# Patient Record
Sex: FEMALE | HISPANIC OR LATINO | Employment: FULL TIME | URBAN - METROPOLITAN AREA
[De-identification: names, ages, dates, MRNs, and addresses within clinical notes are randomized per-mention and may not be internally consistent; named-entity substitution may affect disease eponyms.]

---

## 2022-04-17 ENCOUNTER — HOSPITAL ENCOUNTER (EMERGENCY)
Facility: CLINIC | Age: 32
Discharge: HOME | End: 2022-04-17
Attending: EMERGENCY MEDICINE | Admitting: EMERGENCY MEDICINE
Payer: COMMERCIAL

## 2022-04-17 VITALS
DIASTOLIC BLOOD PRESSURE: 95 MMHG | TEMPERATURE: 98.6 F | OXYGEN SATURATION: 100 % | RESPIRATION RATE: 18 BRPM | HEART RATE: 73 BPM | WEIGHT: 196.21 LBS | SYSTOLIC BLOOD PRESSURE: 135 MMHG

## 2022-04-17 DIAGNOSIS — R10.12 LUQ ABDOMINAL PAIN: ICD-10-CM

## 2022-04-17 DIAGNOSIS — K29.50 CHRONIC GASTRITIS WITHOUT BLEEDING, UNSPECIFIED GASTRITIS TYPE: ICD-10-CM

## 2022-04-17 LAB
ALBUMIN SERPL-MCNC: 3.9 G/DL (ref 3.4–5)
ALBUMIN UR-MCNC: NEGATIVE MG/DL
ALP SERPL-CCNC: 53 U/L (ref 40–150)
ALT SERPL W P-5'-P-CCNC: 25 U/L (ref 0–50)
ANION GAP SERPL CALCULATED.3IONS-SCNC: 2 MMOL/L (ref 3–14)
APPEARANCE UR: CLEAR
AST SERPL W P-5'-P-CCNC: 17 U/L (ref 0–45)
BASOPHILS # BLD AUTO: 0 10E3/UL (ref 0–0.2)
BASOPHILS NFR BLD AUTO: 1 %
BILIRUB SERPL-MCNC: 0.3 MG/DL (ref 0.2–1.3)
BILIRUB UR QL STRIP: NEGATIVE
BUN SERPL-MCNC: 16 MG/DL (ref 7–30)
CALCIUM SERPL-MCNC: 8.9 MG/DL (ref 8.5–10.1)
CHLORIDE BLD-SCNC: 109 MMOL/L (ref 94–109)
CO2 SERPL-SCNC: 28 MMOL/L (ref 20–32)
COLOR UR AUTO: ABNORMAL
CREAT SERPL-MCNC: 0.55 MG/DL (ref 0.52–1.04)
EOSINOPHIL # BLD AUTO: 0.2 10E3/UL (ref 0–0.7)
EOSINOPHIL NFR BLD AUTO: 4 %
ERYTHROCYTE [DISTWIDTH] IN BLOOD BY AUTOMATED COUNT: 13.1 % (ref 10–15)
GFR SERPL CREATININE-BSD FRML MDRD: >90 ML/MIN/1.73M2
GLUCOSE BLD-MCNC: 91 MG/DL (ref 70–99)
GLUCOSE UR STRIP-MCNC: NEGATIVE MG/DL
HCG UR QL: NEGATIVE
HCT VFR BLD AUTO: 37.1 % (ref 35–47)
HGB BLD-MCNC: 12.1 G/DL (ref 11.7–15.7)
HGB UR QL STRIP: ABNORMAL
HOLD SPECIMEN: NORMAL
IMM GRANULOCYTES # BLD: 0 10E3/UL
IMM GRANULOCYTES NFR BLD: 0 %
KETONES UR STRIP-MCNC: NEGATIVE MG/DL
LEUKOCYTE ESTERASE UR QL STRIP: NEGATIVE
LIPASE SERPL-CCNC: 100 U/L (ref 73–393)
LYMPHOCYTES # BLD AUTO: 1.2 10E3/UL (ref 0.8–5.3)
LYMPHOCYTES NFR BLD AUTO: 33 %
MCH RBC QN AUTO: 28.6 PG (ref 26.5–33)
MCHC RBC AUTO-ENTMCNC: 32.6 G/DL (ref 31.5–36.5)
MCV RBC AUTO: 88 FL (ref 78–100)
MONOCYTES # BLD AUTO: 0.3 10E3/UL (ref 0–1.3)
MONOCYTES NFR BLD AUTO: 8 %
NEUTROPHILS # BLD AUTO: 1.8 10E3/UL (ref 1.6–8.3)
NEUTROPHILS NFR BLD AUTO: 54 %
NITRATE UR QL: NEGATIVE
NRBC # BLD AUTO: 0 10E3/UL
NRBC BLD AUTO-RTO: 0 /100
PH UR STRIP: 7 [PH] (ref 5–7)
PLATELET # BLD AUTO: 252 10E3/UL (ref 150–450)
POTASSIUM BLD-SCNC: 3.6 MMOL/L (ref 3.4–5.3)
PROT SERPL-MCNC: 7.1 G/DL (ref 6.8–8.8)
RBC # BLD AUTO: 4.23 10E6/UL (ref 3.8–5.2)
RBC URINE: 15 /HPF
SODIUM SERPL-SCNC: 139 MMOL/L (ref 133–144)
SP GR UR STRIP: 1.01 (ref 1–1.03)
SQUAMOUS EPITHELIAL: 8 /HPF
UROBILINOGEN UR STRIP-MCNC: NORMAL MG/DL
WBC # BLD AUTO: 3.5 10E3/UL (ref 4–11)
WBC URINE: 2 /HPF

## 2022-04-17 PROCEDURE — 83690 ASSAY OF LIPASE: CPT | Performed by: EMERGENCY MEDICINE

## 2022-04-17 PROCEDURE — 80053 COMPREHEN METABOLIC PANEL: CPT | Performed by: EMERGENCY MEDICINE

## 2022-04-17 PROCEDURE — 36415 COLL VENOUS BLD VENIPUNCTURE: CPT | Performed by: EMERGENCY MEDICINE

## 2022-04-17 PROCEDURE — 85025 COMPLETE CBC W/AUTO DIFF WBC: CPT | Performed by: EMERGENCY MEDICINE

## 2022-04-17 PROCEDURE — 250N000013 HC RX MED GY IP 250 OP 250 PS 637: Performed by: EMERGENCY MEDICINE

## 2022-04-17 PROCEDURE — 99283 EMERGENCY DEPT VISIT LOW MDM: CPT

## 2022-04-17 PROCEDURE — 81001 URINALYSIS AUTO W/SCOPE: CPT | Performed by: EMERGENCY MEDICINE

## 2022-04-17 PROCEDURE — 250N000009 HC RX 250: Performed by: EMERGENCY MEDICINE

## 2022-04-17 PROCEDURE — 81025 URINE PREGNANCY TEST: CPT | Performed by: EMERGENCY MEDICINE

## 2022-04-17 RX ORDER — SUCRALFATE 1 G/1
1 TABLET ORAL 4 TIMES DAILY
Qty: 40 TABLET | Refills: 0 | Status: SHIPPED | OUTPATIENT
Start: 2022-04-17 | End: 2022-04-17

## 2022-04-17 RX ORDER — SUCRALFATE 1 G/1
1 TABLET ORAL 4 TIMES DAILY
Qty: 40 TABLET | Refills: 0 | Status: SHIPPED | OUTPATIENT
Start: 2022-04-17 | End: 2022-04-27

## 2022-04-17 RX ADMIN — ALUMINUM HYDROXIDE, MAGNESIUM HYDROXIDE, AND SIMETHICONE 30 ML: 200; 200; 20 SUSPENSION ORAL at 14:15

## 2022-04-17 ASSESSMENT — ENCOUNTER SYMPTOMS
VOMITING: 0
FEVER: 0
NAUSEA: 0
CONSTIPATION: 1
DIARRHEA: 0
ABDOMINAL PAIN: 1

## 2022-04-17 NOTE — ED PROVIDER NOTES
History   Chief Complaint:  Abdominal Pain       The history is provided by the patient. A  was used (Croatian).      Lorin Cano is a 32 year old female with history of gastritis who presents with abdominal pain. Four days ago, the patient developed LUQ abdominal pain that initially lasted throughout the night. The pain resolved but then returned last night and lasted throughout the night. Today, her pain worsened and she cannot walk or move around without significant pain. She notes that her pain is resolved when she lays down. Additionally, she has been constipated but denies diarrhea, nausea, and vomiting. At home, she tried taking omeprazole but has not had any relief. She felt she needed to be seen in the ED today as she was no longer able to tolerate the pain. She also denies any recent fever or urinary symptoms.       Review of Systems   Constitutional: Negative for fever.   Gastrointestinal: Positive for abdominal pain and constipation. Negative for diarrhea, nausea and vomiting.   Genitourinary: Negative.    All other systems reviewed and are negative.      Allergies:  The patient has no known allergies.     Medications:  Omeprazole    Past Medical History:     Gastritis    Past Surgical History:    The patient has no pertinent surgical history.     Family History:    The patient has no known family history.    Social History:  The patient presents to the ED with a friend.      Physical Exam     Patient Vitals for the past 24 hrs:   BP Temp Temp src Pulse Resp SpO2 Weight   04/17/22 1304 (!) 135/95 98.6  F (37  C) Oral 73 18 100 % 89 kg (196 lb 3.4 oz)       Physical Exam  Vitals and nursing note reviewed.   Constitutional:       Appearance: Normal appearance.   HENT:      Head: Atraumatic.      Right Ear: External ear normal.      Left Ear: External ear normal.      Nose: Nose normal.      Mouth/Throat:      Mouth: Mucous membranes are moist.   Eyes:      Extraocular  Movements: Extraocular movements intact.      Conjunctiva/sclera: Conjunctivae normal.   Cardiovascular:      Rate and Rhythm: Normal rate and regular rhythm.      Heart sounds: No murmur heard.  Pulmonary:      Effort: Pulmonary effort is normal. No respiratory distress.      Breath sounds: Normal breath sounds. No wheezing, rhonchi or rales.   Abdominal:      General: Abdomen is flat. Bowel sounds are normal. There is no distension.      Palpations: Abdomen is soft.      Tenderness: There is abdominal tenderness (mild) in the epigastric area and left upper quadrant. There is no right CVA tenderness, left CVA tenderness, guarding or rebound.   Musculoskeletal:         General: No deformity or signs of injury.      Cervical back: Normal range of motion and neck supple.   Skin:     General: Skin is warm and dry.      Findings: No rash.   Neurological:      Mental Status: She is alert and oriented to person, place, and time.   Psychiatric:         Mood and Affect: Mood normal.         Behavior: Behavior normal.           Emergency Department Course     Laboratory:  Labs Ordered and Resulted from Time of ED Arrival to Time of ED Departure   COMPREHENSIVE METABOLIC PANEL - Abnormal       Result Value    Sodium 139      Potassium 3.6      Chloride 109      Carbon Dioxide (CO2) 28      Anion Gap 2 (*)     Urea Nitrogen 16      Creatinine 0.55      Calcium 8.9      Glucose 91      Alkaline Phosphatase 53      AST 17      ALT 25      Protein Total 7.1      Albumin 3.9      Bilirubin Total 0.3      GFR Estimate >90     ROUTINE UA WITH MICROSCOPIC REFLEX TO CULTURE - Abnormal    Color Urine Straw      Appearance Urine Clear      Glucose Urine Negative      Bilirubin Urine Negative      Ketones Urine Negative      Specific Gravity Urine 1.011      Blood Urine Moderate (*)     pH Urine 7.0      Protein Albumin Urine Negative      Urobilinogen Urine Normal      Nitrite Urine Negative      Leukocyte Esterase Urine Negative       RBC Urine 15 (*)     WBC Urine 2      Squamous Epithelials Urine 8 (*)    CBC WITH PLATELETS AND DIFFERENTIAL - Abnormal    WBC Count 3.5 (*)     RBC Count 4.23      Hemoglobin 12.1      Hematocrit 37.1      MCV 88      MCH 28.6      MCHC 32.6      RDW 13.1      Platelet Count 252      % Neutrophils 54      % Lymphocytes 33      % Monocytes 8      % Eosinophils 4      % Basophils 1      % Immature Granulocytes 0      NRBCs per 100 WBC 0      Absolute Neutrophils 1.8      Absolute Lymphocytes 1.2      Absolute Monocytes 0.3      Absolute Eosinophils 0.2      Absolute Basophils 0.0      Absolute Immature Granulocytes 0.0      Absolute NRBCs 0.0     LIPASE - Normal    Lipase 100     HCG QUALITATIVE URINE - Normal    hCG Urine Qualitative Negative            Emergency Department Course:             Reviewed:  I reviewed nursing notes and vitals    Assessments:  1345 I obtained history and examined the patient as noted above.   1623 I rechecked the patient and explained findings.     Interventions:  1415 Lidocaine 2% 15 mL & Maalox 15 mL PO    Disposition:  The patient was discharged to home.     Impression & Plan     CMS Diagnoses: None    Medical Decision Makin yo F with acute on chronic LUQ/epgiastric pain without other symptoms.  Suspect gastritis or PUD, or could be constipation or IBS, less likely pancreatitis.  No RUQ tenderness to suggest biliary etiology.  No lower abd pain/tenderness to suggest appy or GYN etiology.  Will check labs and try GI cocktail.     1623  Pt reports sig improvement after GI cocktail.  Labs are unremarkable.  Has some hematuria but she is on her period.  Will try adding carafate and rec she try TUMS, maalox etc at home PRN and she should f/u with PCP and possibly GI as well.      Diagnosis:    ICD-10-CM    1. LUQ abdominal pain  R10.12    2. Chronic gastritis without bleeding, unspecified gastritis type  K29.50        Discharge Medications:  Discharge Medication List as of  4/17/2022  4:47 PM      START taking these medications    Details   sucralfate (CARAFATE) 1 GM tablet Take 1 tablet (1 g) by mouth 4 times daily for 10 days, Disp-40 tablet, R-0, E-Prescribe             Scribe Disclosure:  I, Yeimi Case, am serving as a scribe at 1:25 PM on 4/17/2022 to document services personally performed by Hernandez Rees MD based on my observations and the provider's statements to me.              Hernandez Rees MD  04/17/22 1914

## 2022-04-17 NOTE — ED TRIAGE NOTES
Pt presents for complaint of upper quadrant abdominal pain since last night. Pt states the pain has been worsening over that period of time. Pt also notes similar pain on Wednesday that lasted into Thursday. Pt states hx of gastritis diagnosed in [place] x3 years ago. Taking omeprazole since then. Denies nausea or vomiting. ABC intact, A&Ox4.

## 2022-12-28 ENCOUNTER — HOSPITAL ENCOUNTER (EMERGENCY)
Facility: CLINIC | Age: 32
Discharge: HOME | End: 2022-12-28
Attending: PHYSICIAN ASSISTANT | Admitting: PHYSICIAN ASSISTANT
Payer: COMMERCIAL

## 2022-12-28 ENCOUNTER — APPOINTMENT (OUTPATIENT)
Dept: GENERAL RADIOLOGY | Facility: CLINIC | Age: 32
End: 2022-12-28
Attending: EMERGENCY MEDICINE
Payer: COMMERCIAL

## 2022-12-28 VITALS
SYSTOLIC BLOOD PRESSURE: 132 MMHG | DIASTOLIC BLOOD PRESSURE: 99 MMHG | OXYGEN SATURATION: 100 % | TEMPERATURE: 98.2 F | RESPIRATION RATE: 17 BRPM | HEART RATE: 86 BPM

## 2022-12-28 DIAGNOSIS — M54.50 ACUTE BILATERAL LOW BACK PAIN WITHOUT SCIATICA: ICD-10-CM

## 2022-12-28 DIAGNOSIS — S16.1XXA STRAIN OF NECK MUSCLE, INITIAL ENCOUNTER: ICD-10-CM
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
## 2022-12-28 PROCEDURE — 71046 X-RAY EXAM CHEST 2 VIEWS: CPT

## 2022-12-28 PROCEDURE — 250N000013 HC RX MED GY IP 250 OP 250 PS 637: Performed by: PHYSICIAN ASSISTANT

## 2022-12-28 PROCEDURE — 99284 EMERGENCY DEPT VISIT MOD MDM: CPT | Mod: 25

## 2022-12-28 PROCEDURE — 72040 X-RAY EXAM NECK SPINE 2-3 VW: CPT

## 2022-12-28 RX ORDER — ACETAMINOPHEN 500 MG
1000 TABLET ORAL EVERY 4 HOURS PRN
Status: DISCONTINUED | OUTPATIENT
Start: 2022-12-28 | End: 2022-12-28 | Stop reason: HOSPADM

## 2022-12-28 RX ADMIN — ACETAMINOPHEN 1000 MG: 500 TABLET ORAL at 20:21

## 2022-12-28 ASSESSMENT — ENCOUNTER SYMPTOMS
BACK PAIN: 1
NECK PAIN: 1

## 2022-12-28 NOTE — LETTER
December 28, 2022      To Whom It May Concern:      Lorin CAMILLA Jose Manuel Cano was seen in our Emergency Department today, 12/28/22.  I expect her condition to improve over the next 2 days.  She may return to work/school when improved.    Sincerely,        ALMITA PANIAGUA PA-C
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
## 2024-02-13 LAB
HEPATITIS B SURFACE ANTIGEN (EXTERNAL): NEGATIVE
HIV1+2 AB SERPL QL IA: NEGATIVE
RUBELLA ANTIBODY IGG (EXTERNAL): NORMAL

## 2024-06-06 LAB — TREPONEMA PALLIDUM ANTIBODY (EXTERNAL): NONREACTIVE

## 2024-08-22 ENCOUNTER — HOSPITAL ENCOUNTER (INPATIENT)
Facility: CLINIC | Age: 34
LOS: 3 days | Discharge: HOME HEALTH | End: 2024-08-25
Attending: STUDENT IN AN ORGANIZED HEALTH CARE EDUCATION/TRAINING PROGRAM | Admitting: STUDENT IN AN ORGANIZED HEALTH CARE EDUCATION/TRAINING PROGRAM
Payer: COMMERCIAL

## 2024-08-22 DIAGNOSIS — O13.9 GESTATIONAL HYPERTENSION, ANTEPARTUM: ICD-10-CM

## 2024-08-22 LAB
ABO/RH(D): NORMAL
ALBUMIN MFR UR ELPH: 11.7 MG/DL
ALBUMIN SERPL BCG-MCNC: 3.6 G/DL (ref 3.5–5.2)
ALP SERPL-CCNC: 174 U/L (ref 40–150)
ALT SERPL W P-5'-P-CCNC: 22 U/L (ref 0–50)
ANION GAP SERPL CALCULATED.3IONS-SCNC: 12 MMOL/L (ref 7–15)
ANTIBODY SCREEN: NEGATIVE
AST SERPL W P-5'-P-CCNC: 26 U/L (ref 0–45)
BILIRUB SERPL-MCNC: 0.2 MG/DL
BUN SERPL-MCNC: 11.6 MG/DL (ref 6–20)
CALCIUM SERPL-MCNC: 9.3 MG/DL (ref 8.8–10.4)
CHLORIDE SERPL-SCNC: 103 MMOL/L (ref 98–107)
CREAT SERPL-MCNC: 0.7 MG/DL (ref 0.51–0.95)
CREAT UR-MCNC: 46.2 MG/DL
EGFRCR SERPLBLD CKD-EPI 2021: >90 ML/MIN/1.73M2
ERYTHROCYTE [DISTWIDTH] IN BLOOD BY AUTOMATED COUNT: 14 % (ref 10–15)
GLUCOSE BLDC GLUCOMTR-MCNC: 82 MG/DL (ref 70–99)
GLUCOSE SERPL-MCNC: 96 MG/DL (ref 70–99)
HCO3 SERPL-SCNC: 22 MMOL/L (ref 22–29)
HCT VFR BLD AUTO: 37.4 % (ref 35–47)
HGB BLD-MCNC: 12.7 G/DL (ref 11.7–15.7)
MCH RBC QN AUTO: 28.5 PG (ref 26.5–33)
MCHC RBC AUTO-ENTMCNC: 34 G/DL (ref 31.5–36.5)
MCV RBC AUTO: 84 FL (ref 78–100)
PLATELET # BLD AUTO: 206 10E3/UL (ref 150–450)
POTASSIUM SERPL-SCNC: 4 MMOL/L (ref 3.4–5.3)
PROT SERPL-MCNC: 6.1 G/DL (ref 6.4–8.3)
PROT/CREAT 24H UR: 0.25 MG/MG CR (ref 0–0.2)
RBC # BLD AUTO: 4.45 10E6/UL (ref 3.8–5.2)
SODIUM SERPL-SCNC: 137 MMOL/L (ref 135–145)
SPECIMEN EXPIRATION DATE: NORMAL
WBC # BLD AUTO: 4.5 10E3/UL (ref 4–11)

## 2024-08-22 PROCEDURE — 36415 COLL VENOUS BLD VENIPUNCTURE: CPT | Performed by: STUDENT IN AN ORGANIZED HEALTH CARE EDUCATION/TRAINING PROGRAM

## 2024-08-22 PROCEDURE — 120N000001 HC R&B MED SURG/OB

## 2024-08-22 PROCEDURE — 250N000013 HC RX MED GY IP 250 OP 250 PS 637: Performed by: STUDENT IN AN ORGANIZED HEALTH CARE EDUCATION/TRAINING PROGRAM

## 2024-08-22 PROCEDURE — 80053 COMPREHEN METABOLIC PANEL: CPT | Performed by: STUDENT IN AN ORGANIZED HEALTH CARE EDUCATION/TRAINING PROGRAM

## 2024-08-22 PROCEDURE — 86780 TREPONEMA PALLIDUM: CPT | Performed by: STUDENT IN AN ORGANIZED HEALTH CARE EDUCATION/TRAINING PROGRAM

## 2024-08-22 PROCEDURE — 86900 BLOOD TYPING SEROLOGIC ABO: CPT | Performed by: STUDENT IN AN ORGANIZED HEALTH CARE EDUCATION/TRAINING PROGRAM

## 2024-08-22 PROCEDURE — 84156 ASSAY OF PROTEIN URINE: CPT | Performed by: STUDENT IN AN ORGANIZED HEALTH CARE EDUCATION/TRAINING PROGRAM

## 2024-08-22 PROCEDURE — 250N000012 HC RX MED GY IP 250 OP 636 PS 637: Performed by: STUDENT IN AN ORGANIZED HEALTH CARE EDUCATION/TRAINING PROGRAM

## 2024-08-22 PROCEDURE — 85027 COMPLETE CBC AUTOMATED: CPT | Performed by: STUDENT IN AN ORGANIZED HEALTH CARE EDUCATION/TRAINING PROGRAM

## 2024-08-22 RX ORDER — CITRIC ACID/SODIUM CITRATE 334-500MG
30 SOLUTION, ORAL ORAL
Status: DISCONTINUED | OUTPATIENT
Start: 2024-08-22 | End: 2024-08-25 | Stop reason: HOSPADM

## 2024-08-22 RX ORDER — NALOXONE HYDROCHLORIDE 0.4 MG/ML
0.2 INJECTION, SOLUTION INTRAMUSCULAR; INTRAVENOUS; SUBCUTANEOUS
Status: DISCONTINUED | OUTPATIENT
Start: 2024-08-22 | End: 2024-08-25 | Stop reason: HOSPADM

## 2024-08-22 RX ORDER — TRANEXAMIC ACID 10 MG/ML
1 INJECTION, SOLUTION INTRAVENOUS EVERY 30 MIN PRN
Status: DISCONTINUED | OUTPATIENT
Start: 2024-08-22 | End: 2024-08-23

## 2024-08-22 RX ORDER — OXYTOCIN 10 [USP'U]/ML
10 INJECTION, SOLUTION INTRAMUSCULAR; INTRAVENOUS
Status: DISCONTINUED | OUTPATIENT
Start: 2024-08-22 | End: 2024-08-25 | Stop reason: HOSPADM

## 2024-08-22 RX ORDER — KETOROLAC TROMETHAMINE 30 MG/ML
30 INJECTION, SOLUTION INTRAMUSCULAR; INTRAVENOUS
Status: COMPLETED | OUTPATIENT
Start: 2024-08-22 | End: 2024-08-23

## 2024-08-22 RX ORDER — PROCHLORPERAZINE 25 MG
25 SUPPOSITORY, RECTAL RECTAL EVERY 12 HOURS PRN
Status: DISCONTINUED | OUTPATIENT
Start: 2024-08-22 | End: 2024-08-25 | Stop reason: HOSPADM

## 2024-08-22 RX ORDER — HUMAN INSULIN 100 [IU]/ML
16 INJECTION, SUSPENSION SUBCUTANEOUS AT BEDTIME
Status: ON HOLD | COMMUNITY
Start: 2024-07-25 | End: 2024-08-25

## 2024-08-22 RX ORDER — TERBUTALINE SULFATE 1 MG/ML
0.25 INJECTION, SOLUTION SUBCUTANEOUS
Status: DISCONTINUED | OUTPATIENT
Start: 2024-08-22 | End: 2024-08-23 | Stop reason: HOSPADM

## 2024-08-22 RX ORDER — LIDOCAINE 40 MG/G
CREAM TOPICAL
Status: DISCONTINUED | OUTPATIENT
Start: 2024-08-22 | End: 2024-08-25 | Stop reason: HOSPADM

## 2024-08-22 RX ORDER — OXYTOCIN 10 [USP'U]/ML
10 INJECTION, SOLUTION INTRAMUSCULAR; INTRAVENOUS
Status: DISCONTINUED | OUTPATIENT
Start: 2024-08-22 | End: 2024-08-23

## 2024-08-22 RX ORDER — NALOXONE HYDROCHLORIDE 0.4 MG/ML
0.4 INJECTION, SOLUTION INTRAMUSCULAR; INTRAVENOUS; SUBCUTANEOUS
Status: DISCONTINUED | OUTPATIENT
Start: 2024-08-22 | End: 2024-08-25 | Stop reason: HOSPADM

## 2024-08-22 RX ORDER — PROCHLORPERAZINE MALEATE 10 MG
10 TABLET ORAL EVERY 6 HOURS PRN
Status: DISCONTINUED | OUTPATIENT
Start: 2024-08-22 | End: 2024-08-25 | Stop reason: HOSPADM

## 2024-08-22 RX ORDER — FAMOTIDINE 20 MG/1
20 TABLET, FILM COATED ORAL
Status: ON HOLD | COMMUNITY
Start: 2024-08-12 | End: 2024-08-25

## 2024-08-22 RX ORDER — ASPIRIN 81 MG/1
1 TABLET ORAL DAILY
Status: ON HOLD | COMMUNITY
Start: 2024-02-13 | End: 2024-08-25

## 2024-08-22 RX ORDER — MISOPROSTOL 100 UG/1
25 TABLET ORAL
Status: DISCONTINUED | OUTPATIENT
Start: 2024-08-22 | End: 2024-08-23 | Stop reason: HOSPADM

## 2024-08-22 RX ORDER — DEXTROSE MONOHYDRATE 25 G/50ML
25-50 INJECTION, SOLUTION INTRAVENOUS
Status: DISCONTINUED | OUTPATIENT
Start: 2024-08-22 | End: 2024-08-23

## 2024-08-22 RX ORDER — MISOPROSTOL 200 UG/1
400 TABLET ORAL
Status: DISCONTINUED | OUTPATIENT
Start: 2024-08-22 | End: 2024-08-23

## 2024-08-22 RX ORDER — PRENATAL VIT/IRON FUM/FOLIC AC 27MG-0.8MG
1 TABLET ORAL DAILY
COMMUNITY
Start: 2024-02-06

## 2024-08-22 RX ORDER — ACETAMINOPHEN 325 MG/1
650 TABLET ORAL EVERY 4 HOURS PRN
Status: DISCONTINUED | OUTPATIENT
Start: 2024-08-22 | End: 2024-08-23

## 2024-08-22 RX ORDER — OXYTOCIN/0.9 % SODIUM CHLORIDE 30/500 ML
340 PLASTIC BAG, INJECTION (ML) INTRAVENOUS CONTINUOUS PRN
Status: DISCONTINUED | OUTPATIENT
Start: 2024-08-22 | End: 2024-08-23

## 2024-08-22 RX ORDER — ONDANSETRON 2 MG/ML
4 INJECTION INTRAMUSCULAR; INTRAVENOUS EVERY 6 HOURS PRN
Status: DISCONTINUED | OUTPATIENT
Start: 2024-08-22 | End: 2024-08-25 | Stop reason: HOSPADM

## 2024-08-22 RX ORDER — SODIUM CHLORIDE, SODIUM LACTATE, POTASSIUM CHLORIDE, CALCIUM CHLORIDE 600; 310; 30; 20 MG/100ML; MG/100ML; MG/100ML; MG/100ML
INJECTION, SOLUTION INTRAVENOUS CONTINUOUS
Status: DISCONTINUED | OUTPATIENT
Start: 2024-08-22 | End: 2024-08-25 | Stop reason: HOSPADM

## 2024-08-22 RX ORDER — METHYLERGONOVINE MALEATE 0.2 MG/ML
200 INJECTION INTRAVENOUS
Status: DISCONTINUED | OUTPATIENT
Start: 2024-08-22 | End: 2024-08-23

## 2024-08-22 RX ORDER — LOPERAMIDE HCL 2 MG
4 CAPSULE ORAL
Status: DISCONTINUED | OUTPATIENT
Start: 2024-08-22 | End: 2024-08-23

## 2024-08-22 RX ORDER — CARBOPROST TROMETHAMINE 250 UG/ML
250 INJECTION, SOLUTION INTRAMUSCULAR
Status: DISCONTINUED | OUTPATIENT
Start: 2024-08-22 | End: 2024-08-23

## 2024-08-22 RX ORDER — OXYTOCIN/0.9 % SODIUM CHLORIDE 30/500 ML
100-340 PLASTIC BAG, INJECTION (ML) INTRAVENOUS CONTINUOUS PRN
Status: DISCONTINUED | OUTPATIENT
Start: 2024-08-22 | End: 2024-08-23

## 2024-08-22 RX ORDER — ONDANSETRON 4 MG/1
4 TABLET, ORALLY DISINTEGRATING ORAL EVERY 6 HOURS PRN
Status: DISCONTINUED | OUTPATIENT
Start: 2024-08-22 | End: 2024-08-25 | Stop reason: HOSPADM

## 2024-08-22 RX ORDER — IBUPROFEN 800 MG/1
800 TABLET, FILM COATED ORAL
Status: COMPLETED | OUTPATIENT
Start: 2024-08-22 | End: 2024-08-23

## 2024-08-22 RX ORDER — FENTANYL CITRATE 50 UG/ML
50 INJECTION, SOLUTION INTRAMUSCULAR; INTRAVENOUS EVERY 30 MIN PRN
Status: DISCONTINUED | OUTPATIENT
Start: 2024-08-22 | End: 2024-08-23

## 2024-08-22 RX ORDER — MISOPROSTOL 200 UG/1
800 TABLET ORAL
Status: DISCONTINUED | OUTPATIENT
Start: 2024-08-22 | End: 2024-08-23

## 2024-08-22 RX ORDER — LOPERAMIDE HCL 2 MG
2 CAPSULE ORAL
Status: DISCONTINUED | OUTPATIENT
Start: 2024-08-22 | End: 2024-08-23

## 2024-08-22 RX ORDER — NICOTINE POLACRILEX 4 MG
15-30 LOZENGE BUCCAL
Status: DISCONTINUED | OUTPATIENT
Start: 2024-08-22 | End: 2024-08-23

## 2024-08-22 RX ORDER — METOCLOPRAMIDE 10 MG/1
10 TABLET ORAL EVERY 6 HOURS PRN
Status: DISCONTINUED | OUTPATIENT
Start: 2024-08-22 | End: 2024-08-25 | Stop reason: HOSPADM

## 2024-08-22 RX ORDER — HYDROXYZINE HYDROCHLORIDE 50 MG/1
50 TABLET, FILM COATED ORAL
Status: DISCONTINUED | OUTPATIENT
Start: 2024-08-22 | End: 2024-08-23 | Stop reason: HOSPADM

## 2024-08-22 RX ORDER — METOCLOPRAMIDE HYDROCHLORIDE 5 MG/ML
10 INJECTION INTRAMUSCULAR; INTRAVENOUS EVERY 6 HOURS PRN
Status: DISCONTINUED | OUTPATIENT
Start: 2024-08-22 | End: 2024-08-25 | Stop reason: HOSPADM

## 2024-08-22 RX ADMIN — INSULIN HUMAN 8 UNITS: 100 INJECTION, SUSPENSION SUBCUTANEOUS at 22:28

## 2024-08-22 RX ADMIN — MISOPROSTOL 25 MCG: 100 TABLET ORAL at 21:19

## 2024-08-22 RX ADMIN — MISOPROSTOL 25 MCG: 100 TABLET ORAL at 23:21

## 2024-08-22 ASSESSMENT — ACTIVITIES OF DAILY LIVING (ADL)
ADLS_ACUITY_SCORE: 18

## 2024-08-23 LAB
ALBUMIN SERPL BCG-MCNC: 3.4 G/DL (ref 3.5–5.2)
ALP SERPL-CCNC: 154 U/L (ref 40–150)
ALT SERPL W P-5'-P-CCNC: 18 U/L (ref 0–50)
ANION GAP SERPL CALCULATED.3IONS-SCNC: 12 MMOL/L (ref 7–15)
AST SERPL W P-5'-P-CCNC: 23 U/L (ref 0–45)
BILIRUB SERPL-MCNC: 0.2 MG/DL
BUN SERPL-MCNC: 10.7 MG/DL (ref 6–20)
CALCIUM SERPL-MCNC: 8.7 MG/DL (ref 8.8–10.4)
CHLORIDE SERPL-SCNC: 104 MMOL/L (ref 98–107)
CREAT SERPL-MCNC: 0.57 MG/DL (ref 0.51–0.95)
EGFRCR SERPLBLD CKD-EPI 2021: >90 ML/MIN/1.73M2
ERYTHROCYTE [DISTWIDTH] IN BLOOD BY AUTOMATED COUNT: 13.9 % (ref 10–15)
GLUCOSE BLDC GLUCOMTR-MCNC: 127 MG/DL (ref 70–99)
GLUCOSE SERPL-MCNC: 104 MG/DL (ref 70–99)
HCO3 SERPL-SCNC: 19 MMOL/L (ref 22–29)
HCT VFR BLD AUTO: 38 % (ref 35–47)
HGB BLD-MCNC: 12.2 G/DL (ref 11.7–15.7)
MCH RBC QN AUTO: 27.8 PG (ref 26.5–33)
MCHC RBC AUTO-ENTMCNC: 32.1 G/DL (ref 31.5–36.5)
MCV RBC AUTO: 87 FL (ref 78–100)
PLATELET # BLD AUTO: 204 10E3/UL (ref 150–450)
POTASSIUM SERPL-SCNC: 4.4 MMOL/L (ref 3.4–5.3)
PROT SERPL-MCNC: 5.9 G/DL (ref 6.4–8.3)
RBC # BLD AUTO: 4.39 10E6/UL (ref 3.8–5.2)
SODIUM SERPL-SCNC: 135 MMOL/L (ref 135–145)
T PALLIDUM AB SER QL: NONREACTIVE
WBC # BLD AUTO: 5 10E3/UL (ref 4–11)

## 2024-08-23 PROCEDURE — 999N000080 HC STATISTIC IP LACTATION SERVICES 16-30 MIN

## 2024-08-23 PROCEDURE — 0KQM0ZZ REPAIR PERINEUM MUSCLE, OPEN APPROACH: ICD-10-PCS | Performed by: OBSTETRICS & GYNECOLOGY

## 2024-08-23 PROCEDURE — 250N000009 HC RX 250: Performed by: STUDENT IN AN ORGANIZED HEALTH CARE EDUCATION/TRAINING PROGRAM

## 2024-08-23 PROCEDURE — 120N000001 HC R&B MED SURG/OB

## 2024-08-23 PROCEDURE — 250N000013 HC RX MED GY IP 250 OP 250 PS 637: Performed by: OBSTETRICS & GYNECOLOGY

## 2024-08-23 PROCEDURE — 722N000001 HC LABOR CARE VAGINAL DELIVERY SINGLE

## 2024-08-23 PROCEDURE — 258N000003 HC RX IP 258 OP 636: Performed by: STUDENT IN AN ORGANIZED HEALTH CARE EDUCATION/TRAINING PROGRAM

## 2024-08-23 PROCEDURE — 80053 COMPREHEN METABOLIC PANEL: CPT | Performed by: STUDENT IN AN ORGANIZED HEALTH CARE EDUCATION/TRAINING PROGRAM

## 2024-08-23 PROCEDURE — 250N000011 HC RX IP 250 OP 636: Performed by: STUDENT IN AN ORGANIZED HEALTH CARE EDUCATION/TRAINING PROGRAM

## 2024-08-23 PROCEDURE — 59409 OBSTETRICAL CARE: CPT | Performed by: OBSTETRICS & GYNECOLOGY

## 2024-08-23 PROCEDURE — 250N000013 HC RX MED GY IP 250 OP 250 PS 637: Performed by: STUDENT IN AN ORGANIZED HEALTH CARE EDUCATION/TRAINING PROGRAM

## 2024-08-23 PROCEDURE — 85014 HEMATOCRIT: CPT | Performed by: STUDENT IN AN ORGANIZED HEALTH CARE EDUCATION/TRAINING PROGRAM

## 2024-08-23 PROCEDURE — 36415 COLL VENOUS BLD VENIPUNCTURE: CPT | Performed by: STUDENT IN AN ORGANIZED HEALTH CARE EDUCATION/TRAINING PROGRAM

## 2024-08-23 RX ORDER — DOCUSATE SODIUM 100 MG/1
100 CAPSULE, LIQUID FILLED ORAL DAILY
Status: DISCONTINUED | OUTPATIENT
Start: 2024-08-23 | End: 2024-08-25 | Stop reason: HOSPADM

## 2024-08-23 RX ORDER — LABETALOL HYDROCHLORIDE 5 MG/ML
20-80 INJECTION, SOLUTION INTRAVENOUS EVERY 10 MIN PRN
Status: DISCONTINUED | OUTPATIENT
Start: 2024-08-23 | End: 2024-08-25 | Stop reason: HOSPADM

## 2024-08-23 RX ORDER — IBUPROFEN 800 MG/1
800 TABLET, FILM COATED ORAL EVERY 6 HOURS PRN
Status: DISCONTINUED | OUTPATIENT
Start: 2024-08-23 | End: 2024-08-25 | Stop reason: HOSPADM

## 2024-08-23 RX ORDER — OXYTOCIN/0.9 % SODIUM CHLORIDE 30/500 ML
340 PLASTIC BAG, INJECTION (ML) INTRAVENOUS CONTINUOUS PRN
Status: DISCONTINUED | OUTPATIENT
Start: 2024-08-23 | End: 2024-08-25 | Stop reason: HOSPADM

## 2024-08-23 RX ORDER — HYDRALAZINE HYDROCHLORIDE 20 MG/ML
10 INJECTION INTRAMUSCULAR; INTRAVENOUS
Status: DISCONTINUED | OUTPATIENT
Start: 2024-08-23 | End: 2024-08-25 | Stop reason: HOSPADM

## 2024-08-23 RX ORDER — NICOTINE POLACRILEX 4 MG
15-30 LOZENGE BUCCAL
Status: DISCONTINUED | OUTPATIENT
Start: 2024-08-23 | End: 2024-08-25 | Stop reason: HOSPADM

## 2024-08-23 RX ORDER — CARBOPROST TROMETHAMINE 250 UG/ML
250 INJECTION, SOLUTION INTRAMUSCULAR
Status: DISCONTINUED | OUTPATIENT
Start: 2024-08-23 | End: 2024-08-25 | Stop reason: HOSPADM

## 2024-08-23 RX ORDER — NIFEDIPINE 30 MG/1
30 TABLET, EXTENDED RELEASE ORAL DAILY
Status: DISCONTINUED | OUTPATIENT
Start: 2024-08-23 | End: 2024-08-25 | Stop reason: HOSPADM

## 2024-08-23 RX ORDER — OXYTOCIN 10 [USP'U]/ML
10 INJECTION, SOLUTION INTRAMUSCULAR; INTRAVENOUS
Status: DISCONTINUED | OUTPATIENT
Start: 2024-08-23 | End: 2024-08-25 | Stop reason: HOSPADM

## 2024-08-23 RX ORDER — HYDROCORTISONE 25 MG/G
CREAM TOPICAL 3 TIMES DAILY PRN
Status: DISCONTINUED | OUTPATIENT
Start: 2024-08-23 | End: 2024-08-25 | Stop reason: HOSPADM

## 2024-08-23 RX ORDER — BISACODYL 10 MG
10 SUPPOSITORY, RECTAL RECTAL DAILY PRN
Status: DISCONTINUED | OUTPATIENT
Start: 2024-08-23 | End: 2024-08-25 | Stop reason: HOSPADM

## 2024-08-23 RX ORDER — OXYCODONE HYDROCHLORIDE 5 MG/1
5 TABLET ORAL EVERY 4 HOURS PRN
Status: DISCONTINUED | OUTPATIENT
Start: 2024-08-23 | End: 2024-08-25 | Stop reason: HOSPADM

## 2024-08-23 RX ORDER — LOPERAMIDE HCL 2 MG
2 CAPSULE ORAL
Status: DISCONTINUED | OUTPATIENT
Start: 2024-08-23 | End: 2024-08-25 | Stop reason: HOSPADM

## 2024-08-23 RX ORDER — DEXTROSE MONOHYDRATE 25 G/50ML
25-50 INJECTION, SOLUTION INTRAVENOUS
Status: DISCONTINUED | OUTPATIENT
Start: 2024-08-23 | End: 2024-08-25 | Stop reason: HOSPADM

## 2024-08-23 RX ORDER — MISOPROSTOL 200 UG/1
400 TABLET ORAL
Status: DISCONTINUED | OUTPATIENT
Start: 2024-08-23 | End: 2024-08-25 | Stop reason: HOSPADM

## 2024-08-23 RX ORDER — MODIFIED LANOLIN
OINTMENT (GRAM) TOPICAL
Status: DISCONTINUED | OUTPATIENT
Start: 2024-08-23 | End: 2024-08-25 | Stop reason: HOSPADM

## 2024-08-23 RX ORDER — TRANEXAMIC ACID 10 MG/ML
1 INJECTION, SOLUTION INTRAVENOUS EVERY 30 MIN PRN
Status: DISCONTINUED | OUTPATIENT
Start: 2024-08-23 | End: 2024-08-25 | Stop reason: HOSPADM

## 2024-08-23 RX ORDER — METHYLERGONOVINE MALEATE 0.2 MG/ML
200 INJECTION INTRAVENOUS
Status: DISCONTINUED | OUTPATIENT
Start: 2024-08-23 | End: 2024-08-25 | Stop reason: HOSPADM

## 2024-08-23 RX ORDER — LOPERAMIDE HCL 2 MG
4 CAPSULE ORAL
Status: DISCONTINUED | OUTPATIENT
Start: 2024-08-23 | End: 2024-08-25 | Stop reason: HOSPADM

## 2024-08-23 RX ORDER — MISOPROSTOL 200 UG/1
800 TABLET ORAL
Status: DISCONTINUED | OUTPATIENT
Start: 2024-08-23 | End: 2024-08-25 | Stop reason: HOSPADM

## 2024-08-23 RX ORDER — ACETAMINOPHEN 325 MG/1
650 TABLET ORAL EVERY 4 HOURS PRN
Status: DISCONTINUED | OUTPATIENT
Start: 2024-08-23 | End: 2024-08-25 | Stop reason: HOSPADM

## 2024-08-23 RX ADMIN — Medication 340 ML/HR: at 05:23

## 2024-08-23 RX ADMIN — ACETAMINOPHEN 650 MG: 325 TABLET, FILM COATED ORAL at 12:43

## 2024-08-23 RX ADMIN — LIDOCAINE HYDROCHLORIDE 20 ML: 10 INJECTION, SOLUTION EPIDURAL; INFILTRATION; INTRACAUDAL; PERINEURAL at 05:30

## 2024-08-23 RX ADMIN — SODIUM CHLORIDE, POTASSIUM CHLORIDE, SODIUM LACTATE AND CALCIUM CHLORIDE: 600; 310; 30; 20 INJECTION, SOLUTION INTRAVENOUS at 02:00

## 2024-08-23 RX ADMIN — MISOPROSTOL 25 MCG: 100 TABLET ORAL at 03:27

## 2024-08-23 RX ADMIN — MISOPROSTOL 25 MCG: 100 TABLET ORAL at 01:21

## 2024-08-23 RX ADMIN — MISOPROSTOL 800 MCG: 200 TABLET ORAL at 05:41

## 2024-08-23 RX ADMIN — KETOROLAC TROMETHAMINE 30 MG: 30 INJECTION, SOLUTION INTRAMUSCULAR at 06:46

## 2024-08-23 RX ADMIN — NIFEDIPINE 30 MG: 30 TABLET, EXTENDED RELEASE ORAL at 14:17

## 2024-08-23 RX ADMIN — IBUPROFEN 800 MG: 800 TABLET, FILM COATED ORAL at 14:11

## 2024-08-23 RX ADMIN — DOCUSATE SODIUM 100 MG: 100 CAPSULE, LIQUID FILLED ORAL at 10:05

## 2024-08-23 RX ADMIN — IBUPROFEN 800 MG: 800 TABLET, FILM COATED ORAL at 20:36

## 2024-08-23 RX ADMIN — ACETAMINOPHEN 650 MG: 325 TABLET, FILM COATED ORAL at 16:40

## 2024-08-23 ASSESSMENT — ACTIVITIES OF DAILY LIVING (ADL)
ADLS_ACUITY_SCORE: 22
ADLS_ACUITY_SCORE: 18
ADLS_ACUITY_SCORE: 18
ADLS_ACUITY_SCORE: 22
ADLS_ACUITY_SCORE: 22
ADLS_ACUITY_SCORE: 18
ADLS_ACUITY_SCORE: 18
ADLS_ACUITY_SCORE: 22
ADLS_ACUITY_SCORE: 18
ADLS_ACUITY_SCORE: 18
ADLS_ACUITY_SCORE: 22
ADLS_ACUITY_SCORE: 18
ADLS_ACUITY_SCORE: 22
ADLS_ACUITY_SCORE: 18
ADLS_ACUITY_SCORE: 22

## 2024-08-23 NOTE — PROVIDER NOTIFICATION
08/23/24 0148   Provider Notification   Provider Name/Title Dr. Chan   Method of Notification In Department     MD asked for glucose check on patient after baby was minimal for a while. RN took glucose and asked patient to switch sides. IV fluids will be started at a rate of 75mL/hr per MD request

## 2024-08-23 NOTE — PROVIDER NOTIFICATION
08/22/24 2041   Provider Notification   Provider Name/Title Dr. Alonso   Method of Notification At Bedside   Request Evaluate in Person     MD would like patient to receive 1/2 dose of her prescribed insulin dose. MD would like to start patient on miso PO and then recheck cervix later tonight. Patient is agreeable to plan.

## 2024-08-23 NOTE — DISCHARGE SUMMARY
[facility]   Discharge Summary    Lorin Cano YOB: 1990   MRN [id number] Primary OB: [facility]     Date of Admission: 2024   Date of Discharge: 2024   Admitting Physician: Lennie Chan MD   Discharge Physician:  Radha Freeman,        Admission Diagnoses   - Intrauterine pregnancy at 39w0d  - A2 gestational diabetes  - History of Preeclampsia  - Class 2 obesity     Discharge Diagnoses   - Same, now delivered  - Gestational hypertension  -GDM A2    Procedures   Vaginal delivery on 24 at 39w1d       Medications Prior to Admission     Medications Prior to Admission   Medication Sig Dispense Refill Last Dose    Prenatal Vit-Fe Fumarate-FA (PRENATAL MULTIVITAMIN W/IRON) 27-0.8 MG tablet Take 1 tablet by mouth daily.       [DISCONTINUED] aspirin 81 MG EC tablet Take 1 tablet by mouth daily.       [DISCONTINUED] famotidine (PEPCID) 20 MG tablet Take 20 mg by mouth.       [DISCONTINUED] NOVOLIN N VIAL 100 UNIT/ML susp Inject 16 Units subcutaneously at bedtime.         Discharge Medications     Current Discharge Medication List        START taking these medications    Details   acetaminophen (TYLENOL) 325 MG tablet Take 1-2 tablets (325-650 mg) by mouth every 6 hours as needed for mild pain or fever.  Qty: 60 tablet, Refills: 1    Associated Diagnoses:  (spontaneous vaginal delivery)      ibuprofen (ADVIL/MOTRIN) 800 MG tablet Take 1 tablet (800 mg) by mouth every 6 hours as needed for other or moderate pain (cramping).  Qty: 30 tablet, Refills: 1    Associated Diagnoses:  (spontaneous vaginal delivery)      NIFEdipine ER OSMOTIC (ADALAT CC) 30 MG 24 hr tablet Take 1 tablet (30 mg) by mouth daily.  Qty: 30 tablet, Refills: 0    Associated Diagnoses: Gestational hypertension, antepartum           CONTINUE these medications which have NOT CHANGED    Details   Prenatal Vit-Fe Fumarate-FA (PRENATAL MULTIVITAMIN W/IRON) 27-0.8 MG tablet Take 1  tablet by mouth daily.           STOP taking these medications       aspirin 81 MG EC tablet Comments:   Reason for Stopping:         famotidine (PEPCID) 20 MG tablet Comments:   Reason for Stopping:         NOVOLIN N VIAL 100 UNIT/ML susp Comments:   Reason for Stopping:              Brief Admission History     From the admit note of Dr. Chan:   Lorin Cano is a 34 year old  at 39w0d by 11wk US admitted for induction of labor.     #. Induction of Labor:   - IOL for A2 gestational diabetes  - Risks, benefits, alternatives of IOL again explained. All questions answered.   - Initial Reyes score 5, will proceed with PO Misoprostol for cervical ripening, reassess cervix after 4 doses or sooner if clinically indicated.   - Pitocin per protocol for augmentation once sufficiently ripened. AROM as indicated.   - General diet, up ad ophelia. Pain control per patient preference.   - PPH Risk: elevated (induction), no medications contraindicated     #. Fetal Well Being:   - Cephalic, GBS negative  - Continuous external fetal monitoring     #. Gestational diabetes, A2  - On , Vtx, Ant placenta, EFW 44%, AC 77%  -  testing: twice weekly has been reassuring  - Has been on Novolin 16 units at bedtime, will give 1/2 dose (8 units) this evening  - Blood sugar checks per protocol, initiate insulin gtt as indicated      #. Prenatal Care:   - OB labs reviewed: A positive, Rubella immune, Heb B Ag non-reactive, HIV negative, RPR negative  - Genetics: NIPS nl, AFP low risk  - Anatomy ultrasound: level 2 US normal, fundal placenta   - Rh positive, Rhogam not indicated  - GCT failed, failed 2 hr at 28 weeks along with hgb 11.5, RPR  - S/p flu 24, Tdap 24, s/p Covid vaccine   - GBS negative  - Feed: breast, pump script given   - Contraception: pills v Nexplanon  - Peds: Likely PN - [place].      #. Hx Preeclampsia:   - Delivery in [facility]; records not available  - Developed around 38w and had 1  week PP hospital stay  - HELLP labs normal at NOB1 and 28 weeks  - First BP elevated on admission, repeat HELLP labs ordered     #. Disposition: inpatient     Intrapartum Course     Initial SVE was unfavorable and Lorin received 4 doses of Misoprostol for cervical ripening. Shortly after administration of dose #4, she became very uncomfortable and  was 4/70/-2. She rapidly progressed to 9cm dilated and then delivered with the in-house doctor. There was a second degree laceration that was repaired. APGARs 8, 9. EBL 100mL, and rectal misoprostol was administered for a small amount of bleeding after delivery.      Postpartum Course     The patient's hospital course was unremarkable. She received routine postpartum care and recovered without complication. On PPD#2, her pain was well controlled with PO medications and lochia was stable. She was ambulating, voiding without difficulty, and tolerating a general diet. Breastfeeding well. Infant was stable.  She was discharged to home in stable condition.     Intrapartum, she was diagnosed with gestational hypertension based on mild range blood pressures. HELLP labs were collected and were stable. Postpartum, her blood pressures still elevated so procardia 30mg XL was started on PPD #0 and her BP remained normal after that. By PPD#2 her BP was normal and she was stable for discharge.      Postpartum Hemoglobin:   Hemoglobin   Date Value Ref Range Status   08/24/2024 11.1 (L) 11.7 - 15.7 g/dL Final     Rh Status: positive, Rhogam is not indicated.   Rubella Status: immune, MMR is not indicated.     Discharge Instructions & Follow Up     Discharge Diet Regular   Discharge Activity Pelvic rest for 6 weeks including no sexual intercourse, tampons, or douching.     Discharge Follow Up Follow up with primary OB for BP check in 1 week and routine postpartum visit in 6 weeks     Discharge Disposition   Home    Jennifer M. Schlies, DO [facility]  08/23/2024 6:40 AM

## 2024-08-23 NOTE — LACTATION NOTE
Lactation visit with patient with  on IPAD. Patient states she nursed her first for 3-4 weeks had to go back to work and lost supply. Goal is to breastfeed this baby for a longer period of time. Reviewed normal course of lactation, being deligent with feeds to keep infants sugars stable due to mother diabetes.   Assisted with getting infant to breast. Baby needing some stimulation to get latch. Compressions done and baby moved to a suck pattern with swallows heard. Encouraged to watch for feeding cues. Needing a pump for home.     Writer in to assist with 1230 feed. Already had baby latched. Assisted with second breast to get a deeper latch. Good swallows heard. Encouraged to call for assistance prn.

## 2024-08-23 NOTE — CARE PLAN
Data: Patient admitted to room 407 at . Patient is a . Prenatal record reviewed.   OB History    Para Term  AB Living   2 1 1 0 0 1   SAB IAB Ectopic Multiple Live Births   0 0 0 0 1      # Outcome Date GA Lbr Christopher/2nd Weight Sex Type Anes PTL Lv   2 Current            1 Term 05/18/10 38w4d  3.317 kg (7 lb 5 oz) M Vag-Spont   ELISEO      Birth Comments: breastfeeding for 1 month r/t lack of supply      Complications: Preeclampsia/Hypertension      Name: [place]   .  Medical History:   Past Medical History:   Diagnosis Date    Gestational Diabetes    .  Gestational age 39w0d. Vital signs per doc flowsheet. Fetal movement present. Patient reports Induction Of Labor   as reason for admission. Support persons is present.  Action: Verbal consent for EFM, external fetal monitors applied. Admission assessment completed. Patient and support persons educated on labor process. Patient instructed to report change in fetal movement, contractions, vaginal leaking of fluid or bleeding, abdominal pain, or any concerns related to the pregnancy to her nurse/physician. Patient oriented to room, call light in reach.   Response: Dr. Alonso informed of patient admission. Plan per provider is PO miso for a couple of doses then recheck and potentially start pitocin. Patient verbalized understanding of education and verbalized agreement with plan.

## 2024-08-23 NOTE — CARE PLAN
Patient informed RN that she is receiving care from [facility] for food insecurity. She is also requesting that she have more information on at home public health nurse visits for after the infant is born.

## 2024-08-23 NOTE — PLAN OF CARE
Goal Outcome Evaluation:      Plan of Care Reviewed With: patient    Overall Patient Progress: improvingOverall Patient Progress: improving  Mother and baby transferred to PP floor at 0835, mother able to ambulate to bed. Lungs clear, FF @ U, light rubra. Lactation here to assist with breastfeeding, mother placing baby to breast independently, will feed every 3 hours. Pt started on nifedipine today, last BP's were 145/78 and 131/76, HR 70's. Has minimal discomfort in perineal area.  Pt eating well. Mother has support at bedside, will continue plan of care.

## 2024-08-23 NOTE — L&D DELIVERY NOTE
OB Vaginal Delivery Note    Lorin Cano MRN# [id number]   Age: 34 year old YOB: 1990       Mom delivered precipitously.  Re-tearing her previous scar.  Mom having very slow bleed from above, will run the cervix and consider vag pack for one hour if slow bleed continues and no tear is obvious.      GA: 39w1d  GP:   Labor Complications:    EBL:   mL  Delivery QBL:    Delivery Type: Vaginal, Spontaneous   ROM to Delivery Time: (Delivered) Minutes: 1   Weight: 3.24 kg (7 lb 2.3 oz)    1 Minute 5 Minute 10 Minute   Apgar Totals:            MARICRUZ HOLGUIN;PRACHI GAVIRIA     Delivery Details:  Lorin Cano, a 34 year old  female delivered a viable infant with apgars of   and  . Patient was fully dilated and pushing after   hours   minutes in active labor. Delivery was via vaginal, spontaneous  to a sterile field under nitrous oxide  anesthesia. Infant delivered in vertex  left  occiput  anterior  position. Anterior and posterior shoulders delivered without difficulty. The cord was clamped, cut twice and 3 vessels  were noted. Cord blood was obtained in routine fashion with the following disposition: discard .      Cord complications: none   Placenta delivered at 2024  5:18 AM . Placental disposition was Hospital disposal . Fundal massage performed and fundus found to be firm.     Episiotomy: none    Perineum, vagina, cervix were inspected, and the following lacerations were noted:   Perineal lacerations: 2nd                Any lacerations were repaired in the usual fashion using 3-0 vicryl    Excellent hemostasis was noted. Needle count correct. Infant and patient in delivery room in good and stable condition.        Jose Manuel Cano, Female-Lorin [[id number]]      Labor Length      3rd Stage (hrs): 0 (min): 4          Labor Events     labor?: No   steroids: None  Labor Type: Induction/Cervical ripening  Predominate monitoring during 1st stage:  "continuous electronic fetal monitoring       Rupture date/time: 24 0512   Rupture type: Spontaneous Rupture of Membranes  Fluid color: Clear       Delivery/Placenta Date and Time      Delivery Date: 24 Delivery Time:  5:13 AM   Placenta Date/Time: 2024  5:18 AM  Oxytocin given at the time of delivery: after delivery of baby  Delivering clinician: Toni Echevarria MD   Other personnel present at delivery:  Provider Role   Alejandra Corey RN Fischer, Kendra N RN              Vaginal Counts       Initial count performed by 2 team members:  Two Team Members   Angie Echevarria         Needles Suture Needles Sponges (RETIRED) Instruments   Initial counts 2  5    Added to count  1     Relief counts       Final counts               Placed during labor Accounted for at the end of labor   FSE NA    IUPC NA    Cervidil NA                   Final count performed by 2 team members:  Two Team Members   Angie Echevarria      Final count correct?: Yes       Apgars    Living status: Living   1 Minute 5 Minute 10 Minute 15 Minute 20 Minute   Skin color:        Heart rate:        Reflex irritability:        Muscle tone:        Respiratory effort:        Total:               Cord      Vessels: 3 Vessels    Cord Complications: None               Cord Blood Disposition: Discard    Gases Sent?: No    Delayed cord clamping?: Yes    Cord Clamping Delay (seconds): 31-60 seconds            Resuscitation    Methods: None  Output in Delivery Room: Stool       Weston Measurements      Weight: 7 lb 2.3 oz Length: 1' 7\"     Head circumference: 32.5 cm    Output in delivery room: Stool       Skin to Skin and Feeding Plan      Skin to skin initiation date/time: 1841    Skin to skin with: Mother  Skin to skin end date/time:            Delivery (Maternal) (Provider to Complete) ([id number])    Episiotomy: None  Perineal lacerations: 2nd    Repair suture: 3-0 Vicryl  Genital tract inspection done: " Pos       Blood Loss  Mother: Lorin Aguilera #[id number]     Start of Mother's Information      Delivery Blood Loss  08/22/24 1713 - 08/23/24 0554      None                 End of Mother's Information  Mother: Lorin Aguilera #[id number]                Delivery - Provider to Complete ([id number])    Delivering clinician: Toni Echevarria MD  Delivery Type (Choose the 1 that will go to the Birth History): Vaginal, Spontaneous                         Other personnel:  Provider Role   Alejandra Corey RN Fischer, Kendra N, RN                     Placenta    Date/Time: 8/23/2024  5:18 AM  Removal: Spontaneous  Disposition: Hospital disposal             Anesthesia    Method: Nitrous Oxide                    Presentation and Position    Presentation: Vertex    Position: Left Occiput Anterior                     Toni Echevarria MD

## 2024-08-23 NOTE — H&P
[facility]   Labor & Delivery H&P    Lorin Cano YOB: 1990   MRN [id number] Primary OB: [facility]     Hospitals in [place]   Lorin Cano is a 34 year old  at 39w0d admitted for IOL.     Doing well, feeling baby move but slightly less than she's used to. No contractions, vaginal bleeding, or leaking fluid. Has not yet had dinner and has not taken PM insulin. Reports blood sugars have been well controlled - fasting 83, postprandials <120s.      PREGNANCY HISTORY   OB PROBLEM LIST  #. Panamanian Speaking  #. A2 Gestational Diabetes  #. Hx Preeclampsia  #. Class 2 Obesity    OB History    Para Term  AB Living   2 1 1 0 0 1   SAB IAB Ectopic Multiple Live Births   0 0 0 0 1      # Outcome Date GA Lbr Christopher/2nd Weight Sex Type Anes PTL Lv   2 Current            1 Term 05/18/10 38w4d  3.317 kg (7 lb 5 oz) M Vag-Spont   ELISEO      Birth Comments: breastfeeding for 1 month r/t lack of supply      Complications: Preeclampsia/Hypertension      Name: [place]     MATERNAL MEDICAL HISTORY     Past Medical History:   Diagnosis Date    Gestational Diabetes      History reviewed. No pertinent surgical history.    History reviewed. No pertinent family history.    Social History     Tobacco Use    Smoking status: Never    Smokeless tobacco: Never   Substance Use Topics    Alcohol use: Never    Drug use: Never     Medications Prior to Admission   Medication Sig Dispense Refill Last Dose    aspirin 81 MG EC tablet Take 1 tablet by mouth daily.       famotidine (PEPCID) 20 MG tablet Take 20 mg by mouth.       NOVOLIN N VIAL 100 UNIT/ML susp Inject 16 Units subcutaneously at bedtime.       Prenatal Vit-Fe Fumarate-FA (PRENATAL MULTIVITAMIN W/IRON) 27-0.8 MG tablet Take 1 tablet by mouth daily.        No Known Allergies   OBJECTIVE     Vitals:    24   BP:  (!) 149/75   BP Location:  Right arm   Patient Position:  Semi-Silva's   Pulse:  75   Resp:  16   Temp:   "98  F (36.7  C)   TempSrc:  Oral   Weight: 94.8 kg (209 lb 1.3 oz)    Height:  1.6 m (5' 3\")     Physical Exam  General: Alert, in no acute distress, resting comfortably in bed.   Neuro: Grossly normal to observation.  Psych: Alert, oriented, affect appropriate.  Cardiovascular: Normal rate, wwp.   Respiratory: Nonlabored breathing, equal chest rise/fall bilaterally.   Abdomen: Gravid, nontender.   Skin: Color, texture, turgor normal. No concerning rashes or lesions.    SVE Trend  203050/-3 (Reyes 5)    Fetal Monitoring  FHT: baseline 140, moderate variability, 15x15 accels, no decels  Shipman: rare    ASSESSMENT & PLAN   Lorin Cano is a 34 year old  at 39w0d by 11wk US admitted for induction of labor.    #. Induction of Labor:   - IOL for A2 gestational diabetes  - Risks, benefits, alternatives of IOL again explained. All questions answered.   - Initial Reyes score 5, will proceed with PO Misoprostol for cervical ripening, reassess cervix after 4 doses or sooner if clinically indicated.   - Pitocin per protocol for augmentation once sufficiently ripened. AROM as indicated.   - General diet, up ad ophelia. Pain control per patient preference.   - PPH Risk: elevated (induction), no medications contraindicated    #. Fetal Well Being:   - Cephalic, GBS negative  - Continuous external fetal monitoring    #. Gestational diabetes, A2  - On , Vtx, Ant placenta, EFW 44%, AC 77%  -  testing: twice weekly has been reassuring  - Has been on Novolin 16 units at bedtime, will give 1/2 dose (8 units) this evening  - Blood sugar checks per protocol, initiate insulin gtt as indicated     #. Prenatal Care:   - OB labs reviewed: A positive, Rubella immune, Heb B Ag non-reactive, HIV negative, RPR negative  - Genetics: NIPS nl, AFP low risk  - Anatomy ultrasound: level 2 US normal, fundal placenta   - Rh positive, Rhogam not indicated  - GCT failed, failed 2 hr at 28 weeks along with hgb 11.5, RPR  - S/p " flu 2/13/24, Tdap 6/6/24, s/p Covid vaccine   - GBS negative  - Feed: breast, pump script given   - Contraception: pills v Nexplanon  - Peds: Likely PN - [place].     #. Hx Preeclampsia:   - Delivery in [facility]; records not available  - Developed around 38w and had 1 week PP hospital stay  - HELLP labs normal at NOB1 and 28 weeks  - First BP elevated on admission, repeat HELLP labs ordered    #. Disposition: inpatient    MD Estrella Cortezet OBN  [phone]  08/22/2024 8:46 PM

## 2024-08-23 NOTE — PROVIDER NOTIFICATION
08/23/24 0502   Provider Notification   Provider Name/Title    Method of Notification Phone   Request Attend Delivery        in house OB called to bedside for delivery. Primary OB currently occupied in the OR.      Endocrinology Progress Note       SUBJECTIVE   Interval History:  Patient seen in the morning.  Feeling much better this morning.  Pain is better controlled.  Eating okay.  No more steroids today.     OBJECTIVE      Vital signs in last 24 hours:  Temp:  [36.6 °C (97.8 °F)-36.7 °C (98.1 °F)] 36.6 °C (97.8 °F)  Heart Rate:  [80-88] 80  Resp:  [18] 18  BP: (126-141)/(59-74) 136/61      PHYSICAL EXAMINATION        Constitutional: well-developed. well- nourished. Not in distress  HENT: Head: Normocephalic and atraumatic.   Eyes: Conjunctivae and EOM are normal. PERRL  Neck: Normal range of motion. Neck supple.    Pulmonary/Chest: normal breathing effort.    Neurological: Alert and oriented to person, place, and time.   Psychiatric: Normal mood and affect. Judgment normal.          LABS / IMAGING / TELE      Labs    Lab Results   Component Value Date    GLUCOSE 183 (H) 02/28/2023    CALCIUM 8.7 (L) 02/28/2023     (L) 02/28/2023    K 3.8 02/28/2023    CO2 23 02/28/2023     02/28/2023    BUN 16 02/28/2023    CREATININE 0.7 02/28/2023     Lab Results   Component Value Date    HGBA1C 8.8 (H) 02/22/2023     Lab Results   Component Value Date    CHOL 143 10/17/2022     Lab Results   Component Value Date    HDL 46 (L) 10/17/2022     Lab Results   Component Value Date    LDLCALC 85 10/17/2022     Lab Results   Component Value Date    TRIG 62 10/17/2022     No results found for: CHOLHDL  Lab Results   Component Value Date    ALT 24 10/17/2022    AST 22 10/17/2022    ALKPHOS 72 10/17/2022    BILITOT 0.6 10/17/2022     Lab Results   Component Value Date    TSH 0.63 02/22/2023       ASSESSMENT AND PLAN      1, type 2 diabetes  - Uncontrolled baseline on 70/30, SGLT2 inhibitor and GLP-1 agonist.  A1c 8.8.   -GLP-1 agonist and SGLT2 inhibitor was held several days prior to surgery.  -Expect glucose will be worsened with stress/steroids use.      -Continue basal bolus in the hospital.   -Increase Lantus to 30 units  tonight  -Humalog 20 units before breakfast this morning.  Will adjust based on glucose numbers/oral intake. expect her BG will be better today without steroids.   -Humalog sliding scale.   -restarted SGLT2 inhibitor/GLP-1 agonist.      -Patient can be discharged with prior to admission meds.      2, status post right total knee replacement  -Management per primary team     3, hyperlipidemia  -On statin     4, hypertension  -Continue antihypertensives.     Discussed with her nurse.     Thank you very much for allowing me participating in this patient's care. Please feel free to contact me if any questions.          Isabela Claire MD

## 2024-08-23 NOTE — PROVIDER NOTIFICATION
08/23/24 0510   Provider Notification   Provider Name/Title Dr QUYNH Echevarria   Method of Notification At Bedside   Request Attend Delivery     In house OB at bedside for delivery as primary MD in another c-sec case.

## 2024-08-23 NOTE — PROVIDER NOTIFICATION
08/23/24 4155   Provider Notification   Provider Name/Title Dr. QUYNH Echevarria   Method of Notification Phone     Called in house OB to attend delivery. Patient is 9cm and feeling constant pressure. Dr. Costa in OR. Primary RN, Alejandra at bedside with patient and support person.     -Called by HARSHA Adams

## 2024-08-23 NOTE — PROVIDER NOTIFICATION
08/23/24 0501   Provider Notification   Provider Name/Title    Method of Notification In Department        currently in OR, updated that pt now 9cm. In house OB called to beside.

## 2024-08-23 NOTE — PROVIDER NOTIFICATION
08/23/24 0604   Provider Notification   Provider Name/Title Dr. QUYNH Echevarria   Method of Notification At Bedside   Notification Reason Bleeding     Dr. QUYNH Echevarria at bedside to assess patient's bleeding. Pitocin rate turned back to 340ml/hr per orders and 800mg rectal cytotec given after delivery.

## 2024-08-23 NOTE — PROVIDER NOTIFICATION
08/22/24 2240   Provider Notification   Provider Name/Title Dr. Chan   Method of Notification Electronic Page   Request Evaluate - Remote     MD updated that patient has documents for her leave from work that need to be signed before discharge. MD also requested that patient receive 4 doses of miso then will recheck 2 hours after.     No new orders at this time

## 2024-08-23 NOTE — PROVIDER NOTIFICATION
08/23/24 0420   Provider Notification   Provider Name/Title    Method of Notification In Department   Notification Reason Status Update       Primary RN called out to update  on SVE 4/70/-2. Pt plans to go unmedicated. /90, was checked during ctx, primary RN will recheck BP.

## 2024-08-23 NOTE — PROVIDER NOTIFICATION
08/23/24 0928   Provider Notification   Provider Name/Title Dr Freeman   Method of Notification In Department   Request Evaluate-Remote   Notification Reason Vital Signs Change     Updated on BP trend (see flowsheets)  Order to continue Q4VS per HTN orderset - notify only if severe range.

## 2024-08-23 NOTE — PLAN OF CARE
Data: Lorin Cano transferred to [facility] via wheelchair at 0840. Baby transferred via parent's arms.  Action: Receiving unit notified of transfer: Yes. Patient and family notified of room change. Report given to Krissy MCLEOD at 0840. Belongings sent to receiving unit. Accompanied by Registered Nurse. Oriented patient to surroundings. Call light within reach. ID bands double-checked with receiving RN.  Response: Patient tolerated transfer and is stable.

## 2024-08-24 LAB
ALBUMIN SERPL BCG-MCNC: 3.3 G/DL (ref 3.5–5.2)
ALP SERPL-CCNC: 126 U/L (ref 40–150)
ALT SERPL W P-5'-P-CCNC: 16 U/L (ref 0–50)
ANION GAP SERPL CALCULATED.3IONS-SCNC: 10 MMOL/L (ref 7–15)
AST SERPL W P-5'-P-CCNC: 30 U/L (ref 0–45)
BILIRUB SERPL-MCNC: 0.2 MG/DL
BUN SERPL-MCNC: 9.4 MG/DL (ref 6–20)
CALCIUM SERPL-MCNC: 8.4 MG/DL (ref 8.8–10.4)
CHLORIDE SERPL-SCNC: 106 MMOL/L (ref 98–107)
CREAT SERPL-MCNC: 0.63 MG/DL (ref 0.51–0.95)
EGFRCR SERPLBLD CKD-EPI 2021: >90 ML/MIN/1.73M2
ERYTHROCYTE [DISTWIDTH] IN BLOOD BY AUTOMATED COUNT: 14.4 % (ref 10–15)
GLUCOSE BLDC GLUCOMTR-MCNC: 84 MG/DL (ref 70–99)
GLUCOSE SERPL-MCNC: 82 MG/DL (ref 70–99)
HCO3 SERPL-SCNC: 22 MMOL/L (ref 22–29)
HCT VFR BLD AUTO: 34.4 % (ref 35–47)
HGB BLD-MCNC: 11.1 G/DL (ref 11.7–15.7)
MCH RBC QN AUTO: 28.2 PG (ref 26.5–33)
MCHC RBC AUTO-ENTMCNC: 32.3 G/DL (ref 31.5–36.5)
MCV RBC AUTO: 88 FL (ref 78–100)
PLATELET # BLD AUTO: 174 10E3/UL (ref 150–450)
POTASSIUM SERPL-SCNC: 3.9 MMOL/L (ref 3.4–5.3)
PROT SERPL-MCNC: 5.5 G/DL (ref 6.4–8.3)
RBC # BLD AUTO: 3.93 10E6/UL (ref 3.8–5.2)
SODIUM SERPL-SCNC: 138 MMOL/L (ref 135–145)
WBC # BLD AUTO: 5.3 10E3/UL (ref 4–11)

## 2024-08-24 PROCEDURE — 999N000080 HC STATISTIC IP LACTATION SERVICES 16-30 MIN

## 2024-08-24 PROCEDURE — 36415 COLL VENOUS BLD VENIPUNCTURE: CPT | Performed by: OBSTETRICS & GYNECOLOGY

## 2024-08-24 PROCEDURE — 82040 ASSAY OF SERUM ALBUMIN: CPT | Performed by: OBSTETRICS & GYNECOLOGY

## 2024-08-24 PROCEDURE — 85027 COMPLETE CBC AUTOMATED: CPT | Performed by: OBSTETRICS & GYNECOLOGY

## 2024-08-24 PROCEDURE — 120N000001 HC R&B MED SURG/OB

## 2024-08-24 PROCEDURE — 250N000013 HC RX MED GY IP 250 OP 250 PS 637: Performed by: OBSTETRICS & GYNECOLOGY

## 2024-08-24 PROCEDURE — 250N000013 HC RX MED GY IP 250 OP 250 PS 637: Performed by: STUDENT IN AN ORGANIZED HEALTH CARE EDUCATION/TRAINING PROGRAM

## 2024-08-24 RX ADMIN — NIFEDIPINE 30 MG: 30 TABLET, EXTENDED RELEASE ORAL at 09:31

## 2024-08-24 RX ADMIN — ACETAMINOPHEN 650 MG: 325 TABLET, FILM COATED ORAL at 15:50

## 2024-08-24 RX ADMIN — IBUPROFEN 800 MG: 800 TABLET, FILM COATED ORAL at 08:42

## 2024-08-24 RX ADMIN — IBUPROFEN 800 MG: 800 TABLET, FILM COATED ORAL at 23:41

## 2024-08-24 RX ADMIN — DOCUSATE SODIUM 100 MG: 100 CAPSULE, LIQUID FILLED ORAL at 08:42

## 2024-08-24 ASSESSMENT — ACTIVITIES OF DAILY LIVING (ADL)
ADLS_ACUITY_SCORE: 18

## 2024-08-24 NOTE — PLAN OF CARE
"VSS. Denies pain, declining need for pain meds. Ambulating and voiding. Independent with self cares. Fasting BG this AM.     Problem: Adult Inpatient Plan of Care  Goal: Plan of Care Review  Description: The Plan of Care Review/Shift note should be completed every shift.  The Outcome Evaluation is a brief statement about your assessment that the patient is improving, declining, or no change.  This information will be displayed automatically on your shift  note.  Outcome: Progressing  Flowsheets (Taken 8/24/2024 0613)  Plan of Care Reviewed With: patient  Goal: Patient-Specific Goal (Individualized)  Description: You can add care plan individualizations to a care plan. Examples of Individualization might be:  \"Parent requests to be called daily at 9am for status\", \"I have a hard time hearing out of my right ear\", or \"Do not touch me to wake me up as it startles  me\".  Outcome: Progressing  Goal: Absence of Hospital-Acquired Illness or Injury  Outcome: Progressing  Intervention: Prevent Skin Injury  Recent Flowsheet Documentation  Taken 8/23/2024 2324 by Dionne CLEMENTS RN  Body Position: position changed independently  Goal: Optimal Comfort and Wellbeing  Outcome: Progressing  Intervention: Provide Person-Centered Care  Recent Flowsheet Documentation  Taken 8/23/2024 2324 by Dionne CLEMENTS RN  Trust Relationship/Rapport:   care explained   choices provided   emotional support provided   empathic listening provided   questions answered   questions encouraged   reassurance provided   thoughts/feelings acknowledged  Goal: Readiness for Transition of Care  Outcome: Progressing   Goal Outcome Evaluation:      Plan of Care Reviewed With: patient                 "

## 2024-08-24 NOTE — PLAN OF CARE
Patient meeting expected goals. Using ibuprofen for pain. Blood pressures stable. Up to shower this shift. Education completed with ipad interpretor.  with patient this shift per patient request for resources.     Problem: Adult Inpatient Plan of Care  Goal: Plan of Care Review  Description: The Plan of Care Review/Shift note should be completed every shift.  The Outcome Evaluation is a brief statement about your assessment that the patient is improving, declining, or no change.  This information will be displayed automatically on your shift  note.  Outcome: Progressing  Flowsheets (Taken 8/24/2024 1422)  Plan of Care Reviewed With: patient  Overall Patient Progress: improving  Goal: Absence of Hospital-Acquired Illness or Injury  Intervention: Prevent Skin Injury  Recent Flowsheet Documentation  Taken 8/24/2024 0904 by Jazmine Painting RN  Body Position: position changed independently  Intervention: Prevent Infection  Recent Flowsheet Documentation  Taken 8/24/2024 0904 by Jazmine Painting RN  Infection Prevention: hand hygiene promoted     Problem: Labor  Goal: Stable Fetal Wellbeing  Intervention: Promote and Monitor Fetal Wellbeing  Recent Flowsheet Documentation  Taken 8/24/2024 0904 by Jazmine Painting RN  Body Position: position changed independently  Goal: Absence of Infection Signs and Symptoms  Intervention: Prevent or Manage Infection  Recent Flowsheet Documentation  Taken 8/24/2024 0904 by Jazmine Painting RN  Infection Prevention: hand hygiene promoted   Goal Outcome Evaluation:      Plan of Care Reviewed With: patient    Overall Patient Progress: improvingOverall Patient Progress: improving

## 2024-08-24 NOTE — CONSULTS
[facility]  MATERNAL CHILD HEALTH   INITIAL PSYCHOSOCIAL ASSESSMENT     DATA:     Reason for Social Work Consult: Community resources    Presenting Information: SW met with Lorin at bedside to introduce role, offer support and complete initial assessment.    Living Situation/Family Constellation: Lorin lives in [place] with her 14-year-old son Rene. Lorin shared that the FOB does not want to be involved.    Social Support: Lorin shared that her sole social support is her cousin, but that her cousin is very involved and supportive in her life.     Employment/financial support: No concerns reported    Insurance: [employer], patient is also working with [employer] to apply for MA for her baby     Mental Health History: denied any hx of mental health difficulties    History of Postpartum Mood Disorders: pt denied hx of pmad    Chemical Health History: no concerns reported    Community Resources/PHN/WIC  limited ability to access services due to language barrier, sw provided community resources in Chinese       //Baby Supplies: Lorin shared that she has everything she needs for baby at home      INTERVENTION:     JENS completed chart review and collaborated with the multidisciplinary team.   Psychosocial Assessment   Introduction to Maternal Child Health  role and scope of practice   Reviewed Hospital and Community Resources   Assessed Mental Health History and Current Symptoms   Identified stressors, barriers and family concerns   Provided support and active empathetic listening and validation.   Provided psychoeducation on  mood and anxiety disorders, assessed for any current symptoms or history  Provided brochure Depression and Anxiety During and after Pregnancy in Slovenian    ASSESSMENT:     Coping: adequate    Affect: appropriate  Mood: appropriate    Motivation/Ability to Access Services:motivated, independent in accessing services, language is  a barrier but pt is proactive and resourceful     Assessment of Support System: stable, involved, limited    Level of engagement with SW: Engaged and appropriate. Able to seek out SW when needs arise.     Family and parent/infant interactions: Lorin appears to be bonding well with baby.    Assessment of parental risk for PMAD:   average risk     Strengths: caring family, willingness to accept help    Vulnerabilities: none identified at this time      Identified Barriers: language barrier, most family is in [place]    PLAN:   SW discussed strategies for emotional wellness postpartum with the patient. Reviewed signs and symptoms of postpartum mood disorders and talked about how to get help if needed.     Reviewed community resources and gave patient the community resource guide for [facility] in [place].    Reviewed necessity to add baby to insurance within 30 days.    SW will continue to follow throughout pt's Maternal-Child Health Journey as needs arise. SW will continue to collaborate with the multidisciplinary team.    SHAINA Curtis, [facility]   Care Coordinator-Casual  [phone]  [email or web address]

## 2024-08-24 NOTE — LACTATION NOTE
Lactation in to follow up with patient with IPAD. Patient states infant has been very fussy and cluster feeding. Reviewed normal cluster feeds. Assisted with a deeper latch and encouraged breast compressions to encouraged swallows. Swallows pointed out to Lorin. Wanting to supplement with formula. Discussed supplementing after breastfeeding both sides. Given with instructions and Medela pump given for home.questions answered.

## 2024-08-24 NOTE — PROGRESS NOTES
[facility]  PPD# 1    Pt doing well, states she would like to go home today if possible. Denies any signs or symptoms of pre-E. Ambulating, voiding, tolerating PO. Decreased lochia. Pain controlled. Breast feeding and coping well with infant.    Vitals:    24 2036 24 2324 24 0400 24 0842   BP: 133/81 122/75 122/74 133/82   BP Location:  Left arm     Patient Position:  Semi-Silva's     Cuff Size:       Pulse: 74   72   Resp:  20 18 18   Temp: 98.2  F (36.8  C) 98.7  F (37.1  C) 97.7  F (36.5  C) 98  F (36.7  C)   TempSrc: Oral Oral Oral Oral   SpO2:       Weight:       Height:         Abd soft, appropriately tender, nondistended, FFBU, no fundal tenderness  Ext 1+ edema bilat LE, no CT BL    Lab Results   Component Value Date    HGB 11.1 2024       A/P 34 year old  at 39w1d s/p  PPD# 1.        -Hemodynamically stable   -Rh pos  -Diet; regular  -pain Tylenol and Motrin  -Bowel ppx- Senna/docusate/simethicone  -Rubella immune  -Tdap given prenatally  -Breast feeding, encouraged. Continue PNV's, proper hydration and caloric intake couseled  -BC: pills VS Nexplanon    GDMA2  - fasting BS 84  - 2 hr GTT 6-12 wks PP    gHTN  - BPs are wnl  - pt is asymptomatic  - continue BP monitoring  - continue procardia 30 mg XL every day  - repeat labs this am wnl    Language barrier  - Icelandic speaker, encounter done in Uzbek      -Plan; continue routine post-partum care. Pt aware that given BP monitoring and gHTN recommendation is for stay at least until PPD2-3 in order to have appropriate management and reassurance that she is well controlled on current medication. Pt verbalized understanding.       Dr. No   [phone]

## 2024-08-24 NOTE — PLAN OF CARE
"  Problem: Adult Inpatient Plan of Care  Goal: Plan of Care Review  Description: The Plan of Care Review/Shift note should be completed every shift.  The Outcome Evaluation is a brief statement about your assessment that the patient is improving, declining, or no change.  This information will be displayed automatically on your shift  note.  Outcome: Progressing  Flowsheets (Taken 8/23/2024 2151)  Outcome Evaluation: VSS, breastfeeding frequently  Plan of Care Reviewed With: parent  Overall Patient Progress: improving  Goal: Patient-Specific Goal (Individualized)  Description: You can add care plan individualizations to a care plan. Examples of Individualization might be:  \"Parent requests to be called daily at 9am for status\", \"I have a hard time hearing out of my right ear\", or \"Do not touch me to wake me up as it startles  me\".  Outcome: Progressing  Goal: Absence of Hospital-Acquired Illness or Injury  Outcome: Progressing  Intervention: Prevent Skin Injury  Recent Flowsheet Documentation  Taken 8/23/2024 1652 by Elizabeth Curiel, RN  Body Position: position changed independently  Intervention: Prevent Infection  Recent Flowsheet Documentation  Taken 8/23/2024 1652 by Elizabeth Curiel, RN  Infection Prevention: hand hygiene promoted  Goal: Optimal Comfort and Wellbeing  Outcome: Progressing  Intervention: Monitor Pain and Promote Comfort  Recent Flowsheet Documentation  Taken 8/23/2024 1640 by Elizabeth Curiel, RN  Pain Management Interventions:   medication (see MAR)   cold applied  Goal: Readiness for Transition of Care  Outcome: Progressing   Goal Outcome Evaluation:      Plan of Care Reviewed With: parent    Overall Patient Progress: improvingOverall Patient Progress: improving    Outcome Evaluation: VSS, breastfeeding frequently      "

## 2024-08-25 VITALS
SYSTOLIC BLOOD PRESSURE: 124 MMHG | OXYGEN SATURATION: 96 % | HEIGHT: 63 IN | RESPIRATION RATE: 16 BRPM | WEIGHT: 198.3 LBS | BODY MASS INDEX: 35.14 KG/M2 | TEMPERATURE: 98.6 F | DIASTOLIC BLOOD PRESSURE: 82 MMHG | HEART RATE: 70 BPM

## 2024-08-25 PROBLEM — O13.9 GESTATIONAL HYPERTENSION: Status: ACTIVE | Noted: 2024-08-25

## 2024-08-25 PROCEDURE — 250N000013 HC RX MED GY IP 250 OP 250 PS 637: Performed by: OBSTETRICS & GYNECOLOGY

## 2024-08-25 PROCEDURE — 250N000013 HC RX MED GY IP 250 OP 250 PS 637: Performed by: STUDENT IN AN ORGANIZED HEALTH CARE EDUCATION/TRAINING PROGRAM

## 2024-08-25 RX ORDER — NIFEDIPINE 30 MG
30 TABLET, EXTENDED RELEASE ORAL DAILY
Qty: 30 TABLET | Refills: 0 | Status: SHIPPED | OUTPATIENT
Start: 2024-08-25

## 2024-08-25 RX ORDER — IBUPROFEN 800 MG/1
800 TABLET, FILM COATED ORAL EVERY 6 HOURS PRN
Qty: 30 TABLET | Refills: 1 | Status: SHIPPED | OUTPATIENT
Start: 2024-08-25

## 2024-08-25 RX ORDER — ACETAMINOPHEN 325 MG/1
325-650 TABLET ORAL EVERY 6 HOURS PRN
Qty: 60 TABLET | Refills: 1 | Status: SHIPPED | OUTPATIENT
Start: 2024-08-25

## 2024-08-25 RX ADMIN — ACETAMINOPHEN 650 MG: 325 TABLET, FILM COATED ORAL at 07:49

## 2024-08-25 RX ADMIN — NIFEDIPINE 30 MG: 30 TABLET, EXTENDED RELEASE ORAL at 07:49

## 2024-08-25 RX ADMIN — DOCUSATE SODIUM 100 MG: 100 CAPSULE, LIQUID FILLED ORAL at 07:49

## 2024-08-25 ASSESSMENT — ACTIVITIES OF DAILY LIVING (ADL)
ADLS_ACUITY_SCORE: 18

## 2024-08-25 NOTE — DISCHARGE INSTRUCTIONS
"El período posparto: Instrucciones de cuidado-Instrucciones de cuidado  Postpartum: Care Instructions  Instrucciones de cuidado  Después del parto (período posparto), morales cuerpo pasa por muchos cambios. Algunos de estos cambios suceden darlene varias semanas. Darlene las horas posteriores al parto, el cuerpo comienza a recuperarse y se prepara para el amamantamiento. Es posible que darlene devin tiempo se sienta sensible. Las hormonas pueden cambiar morales estado de ánimo sin advertencia y sin motivo aparente.  Darlene las primeras semanas después del parto, muchas mujeres tienen emociones que cambian de marquis a gautam. Es posible que le resulte difícil dormir. Es posible que llore mucho. [place] se llama \"melancolía de la maternidad\". Estas emociones abrumadoras suelen desaparecer dentro de unos días o semanas. Silvia es importante hablar con morales médico acerca de opal sentimientos.  Darlene las primeras semanas después del parto, es fácil cansarse demasiado o sentirse abrumada. No chin demasiados esfuerzos. Descanse cada vez que pueda, acepte la ayuda de los demás, coma duncan y anisha abundantes líquidos.  En el primer par de semanas después de rohith a karissa, es posible que morales médico o partera deseen verla y hacer un plan para cualquier atención de seguimiento que usted pueda necesitar. Probablemente tendrá digna marcus completa de posparto dentro de los primeros 3 meses después del parto. En miki momento, morales médico o partera revisarán morales recuperación del parto. Él o dread también verán cómo está enfrentando usted opal emociones y conversarán sobre opal inquietudes y preguntas.  La atención de seguimiento es digna parte clave de morales tratamiento y seguridad. Asegúrese de hacer y acudir a todas las citas, y llame a morales médico si está teniendo problemas. También es digna buena idea saber los resultados de opal exámenes y mantener digna lista de los medicamentos que terence.   Cómo puede cuidarse en el hogar?  Duerma o descanse cuando morales bebé lo chin.  Si es " posible, permita que wanda familiares o wanda amigos la ayuden con las tareas del hogar. No intente hacerlo todo usted dolores.  Si tiene hemorroides o hinchazón o dolor alrededor de la abertura de la vagina, pruebe aplicarse frío y calor. Puede aplicarse hielo o digna compresa fría en la jeremiah darlene 10 a 20 minutos cada vez. Póngase un paño friend entre el hielo y la piel. Además, trate de sentarse en algunos centímetros de agua tibia (baño de asiento) 3 veces al día y después de las evacuaciones.  [place] los analgésicos (medicamentos para el dolor) exactamente según las indicaciones.  Si el médico le recetó un analgésico, tómelo según las indicaciones.  Si no está tomando un analgésico recetado, pregúntele a morales médico si puede donald henna de venta brian.  Coma más fibra para evitar el estreñimiento. Incluya alimentos lyedi panes y cereales integrales, verduras crudas, frutas crudas y secas, y frijoles (habichuelas).  Ivania mucho líquido. Si tiene digna enfermedad renal, cardíaca o hepática y tiene que restringir los líquidos, hable con morales médico antes de aumentar la cantidad de líquido que maral.  No se lave el interior de la vagina con líquidos (lavados vaginales).  Si tiene puntos de sutura, mantenga la jeremiah limpia con agua tibia, ya sea echándola o rociándola sobre la jeremiah externa de la vagina y el ano después de usar el baño.  Lleve digna lista de preguntas para hacerle a morales médico o partera. Wanda preguntas podrían ser acerca de lo siguiente:  Cambios en los senos, leydi bultos o sensibilidad.  Cuándo esperar que regrese morales período menstrual.  Qué forma de anticoncepción es la más adecuada para usted.  El peso que aumentó darlene el embarazo.  Las opciones de ejercicio.  Qué alimentos y bebidas son mejores para usted, sobre todo si está amamantando.  Problemas que podría tener con la lactancia.  Cuándo puede tener relaciones sexuales. Algunas mujeres joel vez quieran hablar sobre lubricantes vaginales.  Cualquier sentimiento de  tristeza o inquietud que tenga.   Cuándo debe pedir ayuda?  Comparta esta información con morales brent, morales pradeep o digna amiga. Pueden ayudarla a prestar atención a las señales de advertencia.  Llame al 911  en cualquier momento que considere que necesita atención de urgencia. Por ejemplo, llame si:    Tiene pensamientos de lastimarse o de hacerle daño a morales bebé o a otra persona.     Se desmayó (perdió el conocimiento).     Tiene dolor en el pecho, le falta el aire o tose dominick.     Tiene convulsiones.   Dónde obtener ayuda las 24 horas del día, los 7 días de la semana   Si usted o alguien que conoce habla de suicidio, autolesionarse, digna crisis de marlon mental, digna crisis por consumo de sustancias o cualquier otro tipo de malestar psíquico, obtenga ayuda de inmediato. Usted puede:    Marcar [phone] para llamar a la línea de prevención del suicidio y crisis.     Llamar al [phone] ([phone]).     Enviar un mensaje de texto que diga HOME al [phone] para acceder a la línea de mensajes de texto en casos de crisis.   Considere guardar estos números en morales teléfono.  Visite [employer] para obtener más información o conversar en línea.  Llame a morales médico ahora mismo o busque atención médica de inmediato si:    Tiene señales de hemorragia (sangrado excesivo), leydi:  Sangrado vaginal intenso. [place] significa que está empapando digna o más toallas sanitarias en digna hora. O expulsa coágulos de dominick más grandes que un huevo.  Se siente mareada o aturdida, o siente leydi si se fuera a desmayar.  Se siente tan cansada o débil que no puede hacer opal actividades habituales.  Latidos cardíacos acelerados o irregulares.  Dolor abdominal nuevo o más intenso.     Tiene señales de infección, tales leydi:  Fiebre.  Micción frecuente o dolorosa o dominick en la orina.  Secreción vaginal con olor desagradable.  Dolor abdominal nuevo o más intenso.     Tiene síntomas de un coágulo de dominick en la pierna (llamado trombosis venosa  "profunda), tales leydi:  Dolor en la pantorrilla, la parte posterior de la rodilla, el muslo o la mert.  Hinchazón en la pierna o la mert.  Un cambio de color en la pierna o la mert. La piel podría estar enrojecida o morada, según cuál sea morales color de piel normal.     Tiene señales de preeclampsia, tales leydi:  Hinchazón repentina de la kye, las smiley o los pies.  Nuevos problemas de la vista (leydi oscurecimiento, alivia borroso o alivia puntos).  Dolor de antonino intenso.     Tiene señales de insuficiencia cardíaca, tales leydi:  Nueva o mayor falta de aire.  Nueva o mayor hinchazón en las piernas, los tobillos o los pies.  Aumento repentino de peso, leydi más de 2 o 3 libras (0.9 kg o 1.4 kg) en un día o 5 libras (2.3 kg) en digna semana.  Se siente tan cansada o débil que no puede hacer opal actividades habituales.     Se sometió a un alivio del dolor raquídeo o epidural y tiene:  Dolor de espalda nuevo o peor.  Aumento del dolor, la hinchazón, la temperatura o el enrojecimiento en el lugar de la inyección.  Hormigueo, debilidad o entumecimiento en las piernas o la mert.   Preste especial atención a los cambios en morales marlon y asegúrese de comunicarse con morales médico si:    El sangrado vaginal no disminuye.     Siente tristeza, ansiedad o desesperanza darlene más de algunos días.     Tiene problemas con los senos o el amamantamiento.    Dónde puede encontrar más información en inglés?  Vaya a [email or web address]  Escriba [id number] en la búsqueda para aprender más acerca de \"El período posparto: Instrucciones de cuidado-Instrucciones de cuidado.\"  Revisado: 10 carla, 2023               Versión del contenido: 14.0    [id number] Healthwise, [employer]   Las instrucciones de cuidado fueron adaptadas bajo licencia por morales profesional de atención médica. Si usted tiene preguntas sobre digna afección médica o sobre estas instrucciones, siempre pregunte a morales profesional de marlon. [employer] niega " toda garantía o responsabilidad por morales uso de esta información.

## 2024-08-25 NOTE — PLAN OF CARE
Goal Outcome Evaluation:      Plan of Care Reviewed With: patient    Overall Patient Progress: improvingOverall Patient Progress: improving    Vitals stable. Postpartum checks within normal limits. Ambulating independently. Voiding spontaneously. Pain controlled with tylenol and ibuprofen. Breast and formula feeding every 2-3 hours, baby tolerating 15 mL formula and latching well per mother report. Discharge education complete with patient. Blood pressure given to patient and signed for, educated on how to use. Educated to take bp twice a day and to log them all, patient verbalized understanding. Discharge medications given to patient and discussed. Educated on taking her bp medication daily, patient verbalized understanding. Questions encouraged and all questions answered. Bands checked. Walked out with staff at 1220.      Problem: Adult Inpatient Plan of Care  Goal: Plan of Care Review  Outcome: Met  Flowsheets (Taken 8/25/2024 0938)  Plan of Care Reviewed With: patient  Overall Patient Progress: improving  Goal: Patient-Specific Goal (Individualized)  Outcome: Met  Goal: Absence of Hospital-Acquired Illness or Injury  Outcome: Met  Intervention: Prevent Skin Injury  Recent Flowsheet Documentation  Taken 8/25/2024 0740 by Maria Teresa Martin, RN  Body Position: position changed independently  Intervention: Prevent Infection  Recent Flowsheet Documentation  Taken 8/25/2024 0740 by Maria Teresa Martin, RN  Infection Prevention:   rest/sleep promoted   single patient room provided   hand hygiene promoted  Goal: Optimal Comfort and Wellbeing  Outcome: Met  Intervention: Monitor Pain and Promote Comfort  Recent Flowsheet Documentation  Taken 8/25/2024 0749 by Maria Teresa Martin, RN  Pain Management Interventions: medication (see MAR)  Intervention: Provide Person-Centered Care  Recent Flowsheet Documentation  Taken 8/25/2024 0740 by Maria Teresa Martin, RN  Trust Relationship/Rapport:   care explained   choices provided    emotional support provided   empathic listening provided   questions answered   questions encouraged   reassurance provided   thoughts/feelings acknowledged  Goal: Readiness for Transition of Care  Outcome: Met  Flowsheets  Taken 8/25/2024 0937  Anticipated Changes Related to Illness: none  Concerns to be Addressed: all concerns addressed in this encounter  Barriers to Discharge: None  Taken 8/25/2024 0900  Concerns to be Addressed: all concerns addressed in this encounter  Intervention: Mutually Develop Transition Plan  Recent Flowsheet Documentation  Taken 8/25/2024 0937 by Maria Teresa Martin, RN  Discharge Coordination/Progress: Done  Anticipated Changes Related to Illness: none  Concerns to be Addressed: all concerns addressed in this encounter  Taken 8/25/2024 0900 by Maria Teresa Martin, RN  Transportation Concerns: none  Concerns to be Addressed: all concerns addressed in this encounter  Patient/Family Anticipated Services at Transition: none  Patient/Family Anticipates Transition to: home with family  Equipment Currently Used at Home: none     Problem: Postpartum (Vaginal Delivery)  Goal: Successful Parent Role Transition  Outcome: Met  Intervention: Support Parent Role Transition  Recent Flowsheet Documentation  Taken 8/25/2024 0740 by Maria Teresa Martin, RN  Supportive Measures: active listening utilized  Parent-Child Attachment Promotion:   caring behavior modeled   rooming-in promoted   positive reinforcement provided   participation in care promoted  Goal: Hemostasis  Outcome: Met  Goal: Absence of Infection Signs and Symptoms  Outcome: Met  Goal: Anesthesia/Sedation Recovery  Outcome: Met  Goal: Optimal Pain Control and Function  Outcome: Met  Intervention: Prevent or Manage Pain  Recent Flowsheet Documentation  Taken 8/25/2024 0749 by Maria Teresa Martin, RN  Pain Management Interventions: medication (see MAR)  Goal: Effective Urinary Elimination  Outcome: Met

## 2024-08-25 NOTE — PLAN OF CARE
Goal Outcome Evaluation:      Plan of Care Reviewed With: patient    Overall Patient Progress: improvingOverall Patient Progress: improving         Patient's vital signs stable, up independently in room. Voiding spontaneously and drinking adequate fluids. Fundal checks and bleeding within defined limits. Breastfeeding and formula feeding baby. Independent with baby cares. Pain managed with PRN pain medications.       Problem: Adult Inpatient Plan of Care  Goal: Plan of Care Review  Description: The Plan of Care Review/Shift note should be completed every shift.  The Outcome Evaluation is a brief statement about your assessment that the patient is improving, declining, or no change.  This information will be displayed automatically on your shift  note.  Outcome: Progressing  Flowsheets (Taken 8/25/2024 0439)  Plan of Care Reviewed With: patient  Overall Patient Progress: improving  Goal: Absence of Hospital-Acquired Illness or Injury  Outcome: Progressing  Intervention: Prevent Skin Injury  Recent Flowsheet Documentation  Taken 8/24/2024 2336 by Christa Wagner RN  Body Position: position changed independently  Intervention: Prevent Infection  Recent Flowsheet Documentation  Taken 8/24/2024 2336 by Christa Wagner, RN  Infection Prevention:   rest/sleep promoted   hand hygiene promoted  Goal: Optimal Comfort and Wellbeing  Outcome: Progressing  Intervention: Monitor Pain and Promote Comfort  Recent Flowsheet Documentation  Taken 8/24/2024 2336 by Christa Wagner, RN  Pain Management Interventions: medication (see MAR)  Intervention: Provide Person-Centered Care  Recent Flowsheet Documentation  Taken 8/24/2024 2336 by Christa Wagner, RN  Trust Relationship/Rapport:   care explained   choices provided   questions answered   questions encouraged   thoughts/feelings acknowledged  Goal: Readiness for Transition of Care  Outcome: Progressing     Problem: Postpartum (Vaginal Delivery)  Goal: Successful Parent Role  Transition  Outcome: Progressing  Intervention: Support Parent Role Transition  Recent Flowsheet Documentation  Taken 8/24/2024 2336 by Christa Wagner, RN  Supportive Measures:   active listening utilized   goal-setting facilitated   positive reinforcement provided   problem-solving facilitated   self-care encouraged  Parent-Child Attachment Promotion:   caring behavior modeled   cue recognition promoted   face-to-face positioning promoted   interaction encouraged   parent/caregiver presence encouraged   participation in care promoted   positive reinforcement provided   rooming-in promoted   skin-to-skin contact encouraged   strengths emphasized  Goal: Hemostasis  Outcome: Progressing  Goal: Absence of Infection Signs and Symptoms  Outcome: Progressing  Goal: Anesthesia/Sedation Recovery  Outcome: Progressing  Goal: Optimal Pain Control and Function  Outcome: Progressing  Intervention: Prevent or Manage Pain  Recent Flowsheet Documentation  Taken 8/24/2024 2336 by Christa Wagner, RN  Pain Management Interventions: medication (see MAR)  Goal: Effective Urinary Elimination  Outcome: Progressing

## 2024-08-25 NOTE — PROGRESS NOTES
"[facility] Postpartum Note    S:  Lorin Cano feels good this morning. Was able to sleep last night. Pain control adequate. Lochia minimal. Voiding. Breast feeding. Mood Good.     O:    Patient Vitals for the past 24 hrs:   BP Temp Temp src Pulse Resp Weight   24 0740 124/82 98.6  F (37  C) Oral 70 16 --   24 0717 -- -- -- -- -- 89.9 kg (198 lb 4.8 oz)   24 0410 120/75 -- -- 62 -- --   24 2336 137/83 97.8  F (36.6  C) Oral 77 16 --   24 2018 130/73 -- -- 82 -- --   24 1543 108/64 97.7  F (36.5  C) Oral 67 18 --   24 1300 132/79 -- -- 70 -- --   24 1100 -- -- -- -- -- 89.4 kg (197 lb)   24 0842 133/82 98  F (36.7  C) Oral 72 18 --     Recent Labs   Lab 24  0650 24  0624   WBC 5.3 5.0 4.5   HGB 11.1* 12.2 12.7   HCT 34.4* 38.0 37.4   MCV 88 87 84    204 206     Recent Labs   Lab 24  0729 24  0650 24  0624   NA  --  138 135  --  137   POTASSIUM  --  3.9 4.4  --  4.0   CHLORIDE  --  106 104  --  103   CO2  --  22 19*  --  22   ANIONGAP  --  10 12  --  12   GLC 84 82 104*   < > 96   BUN  --  9.4 10.7  --  11.6   CR  --  0.63 0.57  --  0.70   GFRESTIMATED  --  >90 >90  --  >90   RASHEEDA  --  8.4* 8.7*  --  9.3   PROTTOTAL  --  5.5* 5.9*  --  6.1*   ALBUMIN  --  3.3* 3.4*  --  3.6   BILITOTAL  --  0.2 0.2  --  0.2   ALKPHOS  --  126 154*  --  174*   AST  --  30 23  --  26   ALT  --  16 18  --  22    < > = values in this interval not displayed.        General: healthy, alert, and no distress  Abd: soft, appropriately tender, fundus firm  Legs: Non-tender, 0+ pitting edema    No results found for: \"RH\"  Rubella: imune    A/P 34 year old  at 39w1d s/p  PPD# 1.         -Hemodynamically stable   -Rh pos  -Diet; regular  -pain Tylenol and Motrin  -Bowel ppx- Senna/docusate/simethicone  -Rubella immune  -Tdap given prenatally  -Breast feeding, encouraged. Also formula " feeding. Continue PNV's, proper hydration and caloric intake couseled  -BC: pills VS Nexplanon     GDMA2  - normal BS PP  - 2 hr GTT 6-12 wks PP     gHTN  - BPs are wnl  - pt is asymptomatic  - continue BP monitoring until discharge today  - continue procardia 30 mg XL every day  -BP cuff given at discharge      Language barrier  - [place] speaker         -Plan; discharge to home today with BP cuff and make appointment in clinic this week    Friend on phone used as  per patient request     Radha Freeman, , DO

## 2024-08-25 NOTE — LACTATION NOTE
Lactation in to visit patient with  Ipad. Patient states baby is doing both breast and bottle well.  Will continue with plan. Has a Medela pump given to patient yesterday at bedside. All questions answered for home.

## 2024-12-06 ENCOUNTER — APPOINTMENT (OUTPATIENT)
Dept: INTERPRETER SERVICES | Facility: CLINIC | Age: 34
End: 2024-12-06
Payer: COMMERCIAL

## 2025-05-25 ENCOUNTER — HOSPITAL ENCOUNTER (EMERGENCY)
Facility: CLINIC | Age: 35
Discharge: HOME | End: 2025-05-25
Attending: EMERGENCY MEDICINE | Admitting: EMERGENCY MEDICINE
Payer: COMMERCIAL

## 2025-05-25 VITALS
DIASTOLIC BLOOD PRESSURE: 89 MMHG | HEIGHT: 65 IN | TEMPERATURE: 97.2 F | WEIGHT: 215.83 LBS | RESPIRATION RATE: 16 BRPM | HEART RATE: 66 BPM | OXYGEN SATURATION: 98 % | SYSTOLIC BLOOD PRESSURE: 130 MMHG | BODY MASS INDEX: 35.96 KG/M2

## 2025-05-25 DIAGNOSIS — R51.9 ACUTE NONINTRACTABLE HEADACHE, UNSPECIFIED HEADACHE TYPE: ICD-10-CM

## 2025-05-25 DIAGNOSIS — R20.0 BILATERAL HAND NUMBNESS: ICD-10-CM

## 2025-05-25 LAB
ANION GAP SERPL CALCULATED.3IONS-SCNC: 14 MMOL/L (ref 7–15)
BASOPHILS # BLD AUTO: 0 10E3/UL (ref 0–0.2)
BASOPHILS NFR BLD AUTO: 0 %
BUN SERPL-MCNC: 14.3 MG/DL (ref 6–20)
CALCIUM SERPL-MCNC: 9.2 MG/DL (ref 8.8–10.4)
CHLORIDE SERPL-SCNC: 101 MMOL/L (ref 98–107)
CREAT SERPL-MCNC: 0.51 MG/DL (ref 0.51–0.95)
EGFRCR SERPLBLD CKD-EPI 2021: >90 ML/MIN/1.73M2
EOSINOPHIL # BLD AUTO: 0.1 10E3/UL (ref 0–0.7)
EOSINOPHIL NFR BLD AUTO: 1 %
ERYTHROCYTE [DISTWIDTH] IN BLOOD BY AUTOMATED COUNT: 13.4 % (ref 10–15)
GLUCOSE SERPL-MCNC: 108 MG/DL (ref 70–99)
HCG SERPL QL: NEGATIVE
HCO3 SERPL-SCNC: 23 MMOL/L (ref 22–29)
HCT VFR BLD AUTO: 44.6 % (ref 35–47)
HGB BLD-MCNC: 15.2 G/DL (ref 11.7–15.7)
IMM GRANULOCYTES # BLD: 0 10E3/UL
IMM GRANULOCYTES NFR BLD: 0 %
LYMPHOCYTES # BLD AUTO: 1 10E3/UL (ref 0.8–5.3)
LYMPHOCYTES NFR BLD AUTO: 19 %
MCH RBC QN AUTO: 28.2 PG (ref 26.5–33)
MCHC RBC AUTO-ENTMCNC: 34.1 G/DL (ref 31.5–36.5)
MCV RBC AUTO: 83 FL (ref 78–100)
MONOCYTES # BLD AUTO: 0.3 10E3/UL (ref 0–1.3)
MONOCYTES NFR BLD AUTO: 5 %
NEUTROPHILS # BLD AUTO: 4 10E3/UL (ref 1.6–8.3)
NEUTROPHILS NFR BLD AUTO: 75 %
NRBC # BLD AUTO: 0 10E3/UL
NRBC BLD AUTO-RTO: 0 /100
PLATELET # BLD AUTO: 277 10E3/UL (ref 150–450)
POTASSIUM SERPL-SCNC: 3.8 MMOL/L (ref 3.4–5.3)
RBC # BLD AUTO: 5.39 10E6/UL (ref 3.8–5.2)
SODIUM SERPL-SCNC: 138 MMOL/L (ref 135–145)
WBC # BLD AUTO: 5.3 10E3/UL (ref 4–11)

## 2025-05-25 PROCEDURE — 93005 ELECTROCARDIOGRAM TRACING: CPT

## 2025-05-25 PROCEDURE — 258N000003 HC RX IP 258 OP 636: Performed by: EMERGENCY MEDICINE

## 2025-05-25 PROCEDURE — 96374 THER/PROPH/DIAG INJ IV PUSH: CPT

## 2025-05-25 PROCEDURE — 85025 COMPLETE CBC W/AUTO DIFF WBC: CPT | Performed by: EMERGENCY MEDICINE

## 2025-05-25 PROCEDURE — 29125 APPL SHORT ARM SPLINT STATIC: CPT | Mod: RT

## 2025-05-25 PROCEDURE — 250N000011 HC RX IP 250 OP 636: Performed by: EMERGENCY MEDICINE

## 2025-05-25 PROCEDURE — 96375 TX/PRO/DX INJ NEW DRUG ADDON: CPT

## 2025-05-25 PROCEDURE — 96361 HYDRATE IV INFUSION ADD-ON: CPT

## 2025-05-25 PROCEDURE — 84703 CHORIONIC GONADOTROPIN ASSAY: CPT | Performed by: EMERGENCY MEDICINE

## 2025-05-25 PROCEDURE — 36415 COLL VENOUS BLD VENIPUNCTURE: CPT | Performed by: EMERGENCY MEDICINE

## 2025-05-25 PROCEDURE — 82310 ASSAY OF CALCIUM: CPT | Performed by: EMERGENCY MEDICINE

## 2025-05-25 PROCEDURE — 99284 EMERGENCY DEPT VISIT MOD MDM: CPT | Mod: 25

## 2025-05-25 RX ORDER — IBUPROFEN 800 MG/1
800 TABLET, FILM COATED ORAL EVERY 8 HOURS PRN
Qty: 30 TABLET | Refills: 0 | Status: SHIPPED | OUTPATIENT
Start: 2025-05-25 | End: 2025-05-25

## 2025-05-25 RX ORDER — IBUPROFEN 800 MG/1
800 TABLET, FILM COATED ORAL EVERY 8 HOURS PRN
Qty: 30 TABLET | Refills: 0 | Status: SHIPPED | OUTPATIENT
Start: 2025-05-25

## 2025-05-25 RX ORDER — DIPHENHYDRAMINE HYDROCHLORIDE 50 MG/ML
25 INJECTION, SOLUTION INTRAMUSCULAR; INTRAVENOUS ONCE
Status: COMPLETED | OUTPATIENT
Start: 2025-05-25 | End: 2025-05-25

## 2025-05-25 RX ORDER — METOCLOPRAMIDE 10 MG/1
10 TABLET ORAL 4 TIMES DAILY PRN
Qty: 15 TABLET | Refills: 0 | Status: SHIPPED | OUTPATIENT
Start: 2025-05-25

## 2025-05-25 RX ADMIN — DIPHENHYDRAMINE HYDROCHLORIDE 25 MG: 50 INJECTION, SOLUTION INTRAMUSCULAR; INTRAVENOUS at 12:07

## 2025-05-25 RX ADMIN — PROCHLORPERAZINE EDISYLATE 10 MG: 5 INJECTION INTRAMUSCULAR; INTRAVENOUS at 12:07

## 2025-05-25 RX ADMIN — SODIUM CHLORIDE 1000 ML: 0.9 INJECTION, SOLUTION INTRAVENOUS at 12:09

## 2025-05-25 ASSESSMENT — COLUMBIA-SUICIDE SEVERITY RATING SCALE - C-SSRS
6. HAVE YOU EVER DONE ANYTHING, STARTED TO DO ANYTHING, OR PREPARED TO DO ANYTHING TO END YOUR LIFE?: NO
1. IN THE PAST MONTH, HAVE YOU WISHED YOU WERE DEAD OR WISHED YOU COULD GO TO SLEEP AND NOT WAKE UP?: NO
2. HAVE YOU ACTUALLY HAD ANY THOUGHTS OF KILLING YOURSELF IN THE PAST MONTH?: NO

## 2025-05-25 ASSESSMENT — ACTIVITIES OF DAILY LIVING (ADL)
ADLS_ACUITY_SCORE: 50
ADLS_ACUITY_SCORE: 50

## 2025-05-25 NOTE — ED NOTES
Neuro CognitiveCognitive/Neuro/Behavioral WDL: allLevel of Consciousness: alertArousal Level: opens eyes spontaneouslyOrientation: oriented x 4Speech: spontaneous; clearMood/Behavior: calm; cooperativeAdditional Documentation: Headache Assessment (Group)  Sami Coma ScaleBest Eye Response: 4-->(E4) spontaneousBest Motor Response: 6-->(M6) obeys commandsBest Verbal Response: 5-->(V5) orientedGlasgow Coma Scale Score: 15Assessment Qualifiers: patient not sedated/intubated; no eye obstruction present  Hand /Ankle StrengthPlantarflexion, Right: strongDorsiflexion, Left: strongPlantarflexion, Left: strongHand , Right: strongHand , Left: strongDorsiflexion, Right: strong  Headache AssessmentHeadache Location: generalizedAssociated Signs/Symptoms: nausea; vomiting  Pupils (CN II)Pupil PERRLA: yesPupil Size Left: 2 mmPupil Size Right: 2 mm  Motor ResponseMotor Response: general motor responseGeneral Motor Response: purposeful motor response

## 2025-05-25 NOTE — ED TRIAGE NOTES
Patient reports multiple symptoms upon triage.  She reports ongoing headache since yesterday, reports it is constant.  Hx migraines.  Also reports ongoing bilateral hand pain and numbness for about 1 month.  ABCs intact, A&Ox4     Triage Assessment (Adult)       Row Name 05/25/25 0934          Triage Assessment    Airway WDL WDL        Respiratory WDL    Respiratory WDL WDL        Skin Circulation/Temperature WDL    Skin Circulation/Temperature WDL WDL        Cardiac WDL    Cardiac WDL WDL        Peripheral/Neurovascular WDL    Peripheral Neurovascular WDL WDL        Cognitive/Neuro/Behavioral WDL    Cognitive/Neuro/Behavioral WDL WDL

## 2025-05-25 NOTE — Clinical Note
Lorin Camposina Cano was seen and treated in our emergency department on 5/25/2025.  She may return to work on [unfilled].  [unfilled]     If you have any questions or concerns, please don't hesitate to call.      [unfilled]

## 2025-05-25 NOTE — ED PROVIDER NOTES
Emergency Department Note      History of Present Illness     Chief Complaint   Headache and Hand Pain    Patient history provided by patient interpreted by interpretor in [employer].    NATANAEL Padgett I Jose Manuel Cano is a 35 year old female with history of hypertension who presents to the ED for evaluation of headache and hand pain. Patient reports that she gradually developed a headache yesterday. She has a 1 month history of arm numbness that has increased over the last week. She endorses that the numbness in all her fingers that has been present for about a months but has worsened this past seven days. She reports that she has visited the ED for this problem but was instructed that it was a nerve complications and to let it heal over time. She states that yesterday she felt that her head was full of a lot of pressure. She reports that she also had one episode of vomiting yesterday and has been having nausea after. She states that her headache is mostly frontal and has been sensitive to light and sound. She reports that she has taken tylenol but it has not been very helpful. She states that her headache is the same sensation to her chronic migraines despite the hand numbness being new. She reports that she has some weakness and dizziness from this onset. She states that the numbness is worse at night and improves in the morning and throughout the day. She denies fever.    Independent Historian   None    Review of External Notes   I reviewed ED note from 5/20/2025 in which patient was seen for paresthesias but reported paresthesias to the bilateral hands and feet for 1.5 months.  There was suspicion of a peripheral nerve process and patient was referred back to PCP/neurology.    Past Medical History     Medical History and Problem List   History of pre eclampsia in prior pregnancy  Supervision of high risk pregnancy in second trimester  Hypertension  Normal labor and care    Medications  "  Nexplanon  Prilosec  Carafate    Surgical History   The patient does not have any past pertinent surgical history.     Physical Exam     Patient Vitals for the past 24 hrs:   BP Temp Temp src Pulse Resp SpO2 Height Weight   05/25/25 1246 130/89 -- -- 66 -- 98 % -- --   05/25/25 1230 (!) 139/94 -- -- 66 -- 99 % -- --   05/25/25 1208 (!) 146/98 -- -- -- -- 99 % -- --   05/25/25 0934 (!) 165/100 97.2  F (36.2  C) Temporal 76 16 98 % 1.651 m (5' 5\") 97.9 kg (215 lb 13.3 oz)     Physical Exam    HEENT:   Temporal arteries are non-tender.      Oropharynx is moist, without lesions or trismus.  Eyes:   PERRL.  EOMs intact.      No corneal clouding.   NECK:   Supple, no meningismus.       Negative Brudzinski's sign.  CV:    Regular rate and rhythm.    No murmurs, rubs or gallops.  PULM:   Clear to auscultation bilateral.      No respiratory distress.      No stridor or wheezing.  ABD:  Soft, non-tender, non-distended.      No rebound or guarding.  MSK:    No gross deformity to all four extremities.      No significant joint effusions.    Positive median nerve compression test and Tinel's test bilateral  LYMPH:  No cervical lymphadenopathy.  NEURO:  A & O x 3    CN II-XII intact, speech is clear with no aphasia.      Finger to nose within normal limits.  No pronator drift.      Strength is 5/5 in all 4 extremities.  Sensation is intact.      Normal muscular tone, no tremor.    Median, radial, ulnar nerve intact bilateral  SKIN:   Warm, dry and intact.    PSYCH:   Mood is good and affect is appropriate.      Diagnostics     Lab Results   Labs Ordered and Resulted from Time of ED Arrival to Time of ED Departure   BASIC METABOLIC PANEL - Abnormal       Result Value    Sodium 138      Potassium 3.8      Chloride 101      Carbon Dioxide (CO2) 23      Anion Gap 14      Urea Nitrogen 14.3      Creatinine 0.51      GFR Estimate >90      Calcium 9.2      Glucose 108 (*)    CBC WITH PLATELETS AND DIFFERENTIAL - Abnormal    WBC Count " 5.3      RBC Count 5.39 (*)     Hemoglobin 15.2      Hematocrit 44.6      MCV 83      MCH 28.2      MCHC 34.1      RDW 13.4      Platelet Count 277      % Neutrophils 75      % Lymphocytes 19      % Monocytes 5      % Eosinophils 1      % Basophils 0      % Immature Granulocytes 0      NRBCs per 100 WBC 0      Absolute Neutrophils 4.0      Absolute Lymphocytes 1.0      Absolute Monocytes 0.3      Absolute Eosinophils 0.1      Absolute Basophils 0.0      Absolute Immature Granulocytes 0.0      Absolute NRBCs 0.0     HCG QUALITATIVE PREGNANCY - Normal    hCG Serum Qualitative Negative         Imaging   No orders to display       EKG   ECG results from 05/25/25   EKG 12-lead, tracing only     Value    Systolic Blood Pressure     Diastolic Blood Pressure     Ventricular Rate 72    Atrial Rate 72    WY Interval 132    QRS Duration 84        QTc 433    P Axis 33    R AXIS 27    T Axis 36    Interpretation ECG      Sinus rhythm  Normal ECG  No previous ECGs available  Reviewed by Theron Romero MD at 1223          Independent Interpretation   None    ED Course      Medications Administered   Medications   sodium chloride 0.9% BOLUS 1,000 mL (0 mLs Intravenous Stopped 5/25/25 1334)   prochlorperazine (COMPAZINE) injection 10 mg (10 mg Intravenous $Given 5/25/25 1207)   diphenhydrAMINE (BENADRYL) injection 25 mg (25 mg Intravenous $Given 5/25/25 1207)       Procedures   Procedures     Discussion of Management   None    ED Course   ED Course as of 05/25/25 1548   Sun May 25, 2025   1139 I obtained history and examined the patient as noted above.    1258 I went to recheck on this patient.        Additional Documentation  None    Medical Decision Making / Diagnosis     CMS Diagnoses: None    MIPS   None               [facility]     Lorin SAMPSON Jose Manuel Cano is a 35 year old female presents with progressively worsening headache over the last 1 day.  No features concerning for intracranial hemorrhage, mass, dural sinus  thrombosis, temporal arteritis, glaucoma.  No indication for advanced imaging as radiation risk outweighs benefit.  Evaluation is most consistent with recurrent migraine.  She had complete resolution of symptoms with interventions as described above.    She reported a history of bilateral hand numbness for the last 1 month.  Symptoms are symmetric and not consistent with an acute intracranial event.  She has no neck pain or findings to suggest cervical radiculopathy.  She does not have classic distribution of median nerve pathology but has significant worsening with median nerve compression and positive Tinel's test drawing concern for carpal tunnel.  Patient will be given trial of anti-inflammatories and wrist splinting.  Follow-up with PCP orthopedic surgery if symptoms not improving.  Return to ED for worsening symptoms.    Disposition   The patient was discharged.     Diagnosis     ICD-10-CM    1. Acute nonintractable headache, unspecified headache type  R51.9       2. Bilateral hand numbness  R20.0 Wrist/Arm/Hand Bracing Supplies Order Wrist Brace; Bilateral; non-thumb spica           Discharge Medications   Discharge Medication List as of 5/25/2025  1:39 PM        START taking these medications    Details   metoclopramide (REGLAN) 10 MG tablet Take 1 tablet (10 mg) by mouth 4 times daily as needed for nausea, vomiting or other (headache)., Disp-15 tablet, R-0, E-Prescribe               Scribe Disclosure:  I, Aurora Arellano, am serving as a scribe at 11:40 AM on 5/25/2025 to document services personally performed by Theron Romero MD based on my observations and the provider's statements to me.        Theron Romero MD  05/25/25 2942

## 2025-05-27 LAB
ATRIAL RATE - MUSE: 72 BPM
DIASTOLIC BLOOD PRESSURE - MUSE: NORMAL MMHG
INTERPRETATION ECG - MUSE: NORMAL
P AXIS - MUSE: 33 DEGREES
PR INTERVAL - MUSE: 132 MS
QRS DURATION - MUSE: 84 MS
QT - MUSE: 396 MS
QTC - MUSE: 433 MS
R AXIS - MUSE: 27 DEGREES
SYSTOLIC BLOOD PRESSURE - MUSE: NORMAL MMHG
T AXIS - MUSE: 36 DEGREES
VENTRICULAR RATE- MUSE: 72 BPM

## 2025-08-06 LAB
ALBUMIN UR-MCNC: NEGATIVE MG/DL
ANION GAP SERPL CALCULATED.3IONS-SCNC: 14 MMOL/L (ref 7–15)
APPEARANCE UR: CLEAR
BILIRUB UR QL STRIP: NEGATIVE
BUN SERPL-MCNC: 13.9 MG/DL (ref 6–20)
CALCIUM SERPL-MCNC: 9 MG/DL (ref 8.8–10.4)
CHLORIDE SERPL-SCNC: 101 MMOL/L (ref 98–107)
COLOR UR AUTO: ABNORMAL
CREAT SERPL-MCNC: 0.64 MG/DL (ref 0.51–0.95)
EGFRCR SERPLBLD CKD-EPI 2021: >90 ML/MIN/1.73M2
ERYTHROCYTE [DISTWIDTH] IN BLOOD BY AUTOMATED COUNT: 13.2 % (ref 10–15)
GLUCOSE SERPL-MCNC: 137 MG/DL (ref 70–99)
GLUCOSE UR STRIP-MCNC: NEGATIVE MG/DL
HCO3 SERPL-SCNC: 25 MMOL/L (ref 22–29)
HCT VFR BLD AUTO: 38.8 % (ref 35–47)
HGB BLD-MCNC: 13.2 G/DL (ref 11.7–15.7)
HGB UR QL STRIP: NEGATIVE
KETONES UR STRIP-MCNC: NEGATIVE MG/DL
LEUKOCYTE ESTERASE UR QL STRIP: ABNORMAL
MCH RBC QN AUTO: 28.8 PG (ref 26.5–33)
MCHC RBC AUTO-ENTMCNC: 34 G/DL (ref 31.5–36.5)
MCV RBC AUTO: 85 FL (ref 78–100)
MUCOUS THREADS #/AREA URNS LPF: PRESENT /LPF
NITRATE UR QL: NEGATIVE
PH UR STRIP: 6.5 [PH] (ref 5–7)
PLATELET # BLD AUTO: 248 10E3/UL (ref 150–450)
POTASSIUM SERPL-SCNC: 3.6 MMOL/L (ref 3.4–5.3)
RBC # BLD AUTO: 4.59 10E6/UL (ref 3.8–5.2)
RBC URINE: 4 /HPF
SODIUM SERPL-SCNC: 140 MMOL/L (ref 135–145)
SP GR UR STRIP: 1.02 (ref 1–1.03)
SQUAMOUS EPITHELIAL: 3 /HPF
UROBILINOGEN UR STRIP-MCNC: NORMAL MG/DL
WBC # BLD AUTO: 4.5 10E3/UL (ref 4–11)
WBC URINE: 2 /HPF

## 2025-08-06 PROCEDURE — 84703 CHORIONIC GONADOTROPIN ASSAY: CPT | Performed by: EMERGENCY MEDICINE

## 2025-08-06 PROCEDURE — 83690 ASSAY OF LIPASE: CPT | Performed by: EMERGENCY MEDICINE

## 2025-08-06 PROCEDURE — 84460 ALANINE AMINO (ALT) (SGPT): CPT | Performed by: EMERGENCY MEDICINE

## 2025-08-06 PROCEDURE — 81001 URINALYSIS AUTO W/SCOPE: CPT | Performed by: EMERGENCY MEDICINE

## 2025-08-06 PROCEDURE — 82435 ASSAY OF BLOOD CHLORIDE: CPT | Performed by: EMERGENCY MEDICINE

## 2025-08-06 PROCEDURE — 85018 HEMOGLOBIN: CPT | Performed by: EMERGENCY MEDICINE

## 2025-08-06 PROCEDURE — 80048 BASIC METABOLIC PNL TOTAL CA: CPT | Performed by: EMERGENCY MEDICINE

## 2025-08-06 PROCEDURE — 85027 COMPLETE CBC AUTOMATED: CPT | Performed by: EMERGENCY MEDICINE

## 2025-08-06 PROCEDURE — 36415 COLL VENOUS BLD VENIPUNCTURE: CPT | Performed by: EMERGENCY MEDICINE

## 2025-08-06 PROCEDURE — 99285 EMERGENCY DEPT VISIT HI MDM: CPT | Mod: 25 | Performed by: EMERGENCY MEDICINE

## 2025-08-06 ASSESSMENT — COLUMBIA-SUICIDE SEVERITY RATING SCALE - C-SSRS
2. HAVE YOU ACTUALLY HAD ANY THOUGHTS OF KILLING YOURSELF IN THE PAST MONTH?: NO
1. IN THE PAST MONTH, HAVE YOU WISHED YOU WERE DEAD OR WISHED YOU COULD GO TO SLEEP AND NOT WAKE UP?: NO
6. HAVE YOU EVER DONE ANYTHING, STARTED TO DO ANYTHING, OR PREPARED TO DO ANYTHING TO END YOUR LIFE?: NO

## 2025-08-07 ENCOUNTER — APPOINTMENT (OUTPATIENT)
Dept: ULTRASOUND IMAGING | Facility: CLINIC | Age: 35
End: 2025-08-07
Attending: EMERGENCY MEDICINE

## 2025-08-07 ENCOUNTER — HOSPITAL ENCOUNTER (EMERGENCY)
Facility: CLINIC | Age: 35
Discharge: HOME | End: 2025-08-07
Attending: EMERGENCY MEDICINE

## 2025-08-07 VITALS
WEIGHT: 212.96 LBS | DIASTOLIC BLOOD PRESSURE: 88 MMHG | SYSTOLIC BLOOD PRESSURE: 132 MMHG | TEMPERATURE: 96.4 F | RESPIRATION RATE: 18 BRPM | BODY MASS INDEX: 35.44 KG/M2 | HEART RATE: 86 BPM | OXYGEN SATURATION: 100 %

## 2025-08-07 DIAGNOSIS — K80.50 BILIARY COLIC: Primary | ICD-10-CM

## 2025-08-07 LAB
ALBUMIN SERPL BCG-MCNC: 4.7 G/DL (ref 3.5–5.2)
ALP SERPL-CCNC: 94 U/L (ref 40–150)
ALT SERPL W P-5'-P-CCNC: 52 U/L (ref 0–50)
AST SERPL W P-5'-P-CCNC: 89 U/L (ref 0–45)
BILIRUB DIRECT SERPL-MCNC: 0.28 MG/DL (ref 0–0.3)
BILIRUB SERPL-MCNC: 0.7 MG/DL
HCG SERPL QL: NEGATIVE
HOLD SPECIMEN: NORMAL
LIPASE SERPL-CCNC: 29 U/L (ref 13–60)
PROT SERPL-MCNC: 7 G/DL (ref 6.4–8.3)

## 2025-08-07 PROCEDURE — 250N000011 HC RX IP 250 OP 636: Performed by: EMERGENCY MEDICINE

## 2025-08-07 PROCEDURE — 96374 THER/PROPH/DIAG INJ IV PUSH: CPT

## 2025-08-07 PROCEDURE — 76705 ECHO EXAM OF ABDOMEN: CPT

## 2025-08-07 RX ORDER — ONDANSETRON 4 MG/1
4 TABLET, ORALLY DISINTEGRATING ORAL EVERY 8 HOURS PRN
Qty: 10 TABLET | Refills: 0 | Status: SHIPPED | OUTPATIENT
Start: 2025-08-07 | End: 2025-08-10

## 2025-08-07 RX ORDER — OXYCODONE HYDROCHLORIDE 5 MG/1
5 TABLET ORAL EVERY 6 HOURS PRN
Qty: 12 TABLET | Refills: 0 | Status: SHIPPED | OUTPATIENT
Start: 2025-08-07 | End: 2025-08-10

## 2025-08-07 RX ORDER — IBUPROFEN 800 MG/1
800 TABLET, FILM COATED ORAL EVERY 8 HOURS PRN
Qty: 60 TABLET | Refills: 0 | Status: SHIPPED | OUTPATIENT
Start: 2025-08-07 | End: 2025-08-27

## 2025-08-07 RX ORDER — KETOROLAC TROMETHAMINE 15 MG/ML
15 INJECTION, SOLUTION INTRAMUSCULAR; INTRAVENOUS ONCE
Status: COMPLETED | OUTPATIENT
Start: 2025-08-07 | End: 2025-08-07

## 2025-08-07 RX ADMIN — KETOROLAC TROMETHAMINE 15 MG: 15 INJECTION, SOLUTION INTRAMUSCULAR; INTRAVENOUS at 02:41

## 2025-08-07 ASSESSMENT — ACTIVITIES OF DAILY LIVING (ADL)
ADLS_ACUITY_SCORE: 50